# Patient Record
Sex: MALE | Race: WHITE | Employment: FULL TIME | URBAN - METROPOLITAN AREA
[De-identification: names, ages, dates, MRNs, and addresses within clinical notes are randomized per-mention and may not be internally consistent; named-entity substitution may affect disease eponyms.]

---

## 2019-03-15 ENCOUNTER — HOSPITAL ENCOUNTER (OUTPATIENT)
Age: 49
Setting detail: SPECIMEN
Discharge: HOME | End: 2019-03-15
Payer: COMMERCIAL

## 2019-03-15 PROCEDURE — 80048 BASIC METABOLIC PNL TOTAL CA: CPT

## 2019-03-15 PROCEDURE — 85025 COMPLETE CBC W/AUTO DIFF WBC: CPT

## 2019-03-16 LAB
ABSOLUTE EOS #: 0.42 K/UL (ref 0–0.44)
ABSOLUTE IMMATURE GRANULOCYTE: 0.04 K/UL (ref 0–0.3)
ABSOLUTE LYMPH #: 2.75 K/UL (ref 1.1–3.7)
ABSOLUTE MONO #: 1.03 K/UL (ref 0.1–1.2)
ANION GAP SERPL CALCULATED.3IONS-SCNC: 14 MMOL/L (ref 9–17)
BASOPHILS # BLD: 1 % (ref 0–2)
BASOPHILS ABSOLUTE: 0.11 K/UL (ref 0–0.2)
BUN BLDV-MCNC: 17 MG/DL (ref 6–20)
BUN/CREAT BLD: ABNORMAL (ref 9–20)
CALCIUM SERPL-MCNC: 9 MG/DL (ref 8.6–10.4)
CHLORIDE BLD-SCNC: 101 MMOL/L (ref 98–107)
CO2: 25 MMOL/L (ref 20–31)
CREAT SERPL-MCNC: 0.97 MG/DL (ref 0.7–1.2)
DIFFERENTIAL TYPE: ABNORMAL
EOSINOPHILS RELATIVE PERCENT: 3 % (ref 1–4)
GFR AFRICAN AMERICAN: >60 ML/MIN
GFR NON-AFRICAN AMERICAN: >60 ML/MIN
GFR SERPL CREATININE-BSD FRML MDRD: ABNORMAL ML/MIN/{1.73_M2}
GFR SERPL CREATININE-BSD FRML MDRD: ABNORMAL ML/MIN/{1.73_M2}
GLUCOSE BLD-MCNC: 264 MG/DL (ref 70–99)
HCT VFR BLD CALC: 41.8 % (ref 40.7–50.3)
HEMOGLOBIN: 13.6 G/DL (ref 13–17)
IMMATURE GRANULOCYTES: 0 %
LYMPHOCYTES # BLD: 20 % (ref 24–43)
MCH RBC QN AUTO: 29.4 PG (ref 25.2–33.5)
MCHC RBC AUTO-ENTMCNC: 32.5 G/DL (ref 28.4–34.8)
MCV RBC AUTO: 90.5 FL (ref 82.6–102.9)
MONOCYTES # BLD: 7 % (ref 3–12)
NRBC AUTOMATED: 0 PER 100 WBC
PDW BLD-RTO: 13 % (ref 11.8–14.4)
PLATELET # BLD: 224 K/UL (ref 138–453)
PLATELET ESTIMATE: ABNORMAL
PMV BLD AUTO: 12.3 FL (ref 8.1–13.5)
POTASSIUM SERPL-SCNC: 4 MMOL/L (ref 3.7–5.3)
RBC # BLD: 4.62 M/UL (ref 4.21–5.77)
RBC # BLD: ABNORMAL 10*6/UL
SEG NEUTROPHILS: 69 % (ref 36–65)
SEGMENTED NEUTROPHILS ABSOLUTE COUNT: 9.73 K/UL (ref 1.5–8.1)
SODIUM BLD-SCNC: 140 MMOL/L (ref 135–144)
WBC # BLD: 14.1 K/UL (ref 3.5–11.3)
WBC # BLD: ABNORMAL 10*3/UL

## 2019-03-25 PROBLEM — Z22.322 MRSA (METHICILLIN RESISTANT STAPH AUREUS) CULTURE POSITIVE: Status: ACTIVE | Noted: 2019-03-25

## 2019-03-25 PROBLEM — E11.621 DIABETIC ULCER OF TOE OF RIGHT FOOT (HCC): Status: ACTIVE | Noted: 2019-03-25

## 2019-03-25 PROBLEM — M86.071 ACUTE HEMATOGENOUS OSTEOMYELITIS OF RIGHT FOOT (HCC): Status: ACTIVE | Noted: 2019-03-25

## 2019-03-25 PROBLEM — L97.519 DIABETIC ULCER OF TOE OF RIGHT FOOT (HCC): Status: ACTIVE | Noted: 2019-03-25

## 2019-03-25 PROBLEM — Z79.4 CONTROLLED TYPE 2 DIABETES MELLITUS WITH DIABETIC POLYNEUROPATHY, WITH LONG-TERM CURRENT USE OF INSULIN (HCC): Status: ACTIVE | Noted: 2019-03-25

## 2019-03-25 PROBLEM — E11.42 CONTROLLED TYPE 2 DIABETES MELLITUS WITH DIABETIC POLYNEUROPATHY, WITH LONG-TERM CURRENT USE OF INSULIN (HCC): Status: ACTIVE | Noted: 2019-03-25

## 2019-03-25 PROBLEM — L08.89: Status: ACTIVE | Noted: 2019-03-25

## 2019-03-25 PROBLEM — A49.1 GROUP C STREPTOCOCCAL INFECTION: Status: ACTIVE | Noted: 2019-03-25

## 2019-03-26 ENCOUNTER — OFFICE VISIT (OUTPATIENT)
Dept: INFECTIOUS DISEASES | Age: 49
End: 2019-03-26
Payer: COMMERCIAL

## 2019-03-26 VITALS
WEIGHT: 244 LBS | HEART RATE: 87 BPM | HEIGHT: 73 IN | RESPIRATION RATE: 16 BRPM | BODY MASS INDEX: 32.34 KG/M2 | TEMPERATURE: 98.2 F | OXYGEN SATURATION: 99 % | SYSTOLIC BLOOD PRESSURE: 134 MMHG | DIASTOLIC BLOOD PRESSURE: 88 MMHG

## 2019-03-26 DIAGNOSIS — E11.42 CONTROLLED TYPE 2 DIABETES MELLITUS WITH DIABETIC POLYNEUROPATHY, WITH LONG-TERM CURRENT USE OF INSULIN (HCC): ICD-10-CM

## 2019-03-26 DIAGNOSIS — E11.621 DIABETIC ULCER OF TOE OF RIGHT FOOT ASSOCIATED WITH TYPE 2 DIABETES MELLITUS, WITH BONE INVOLVEMENT WITHOUT EVIDENCE OF NECROSIS (HCC): ICD-10-CM

## 2019-03-26 DIAGNOSIS — L08.89 INFECTION DUE TO CORYNEBACTERIUM MINUTISSIMUM: ICD-10-CM

## 2019-03-26 DIAGNOSIS — Z22.322 MRSA (METHICILLIN RESISTANT STAPH AUREUS) CULTURE POSITIVE: ICD-10-CM

## 2019-03-26 DIAGNOSIS — A49.1 GROUP C STREPTOCOCCAL INFECTION: ICD-10-CM

## 2019-03-26 DIAGNOSIS — L97.516 DIABETIC ULCER OF TOE OF RIGHT FOOT ASSOCIATED WITH TYPE 2 DIABETES MELLITUS, WITH BONE INVOLVEMENT WITHOUT EVIDENCE OF NECROSIS (HCC): ICD-10-CM

## 2019-03-26 DIAGNOSIS — M86.9 OSTEOMYELITIS OF TOE OF RIGHT FOOT (HCC): Primary | ICD-10-CM

## 2019-03-26 DIAGNOSIS — Z79.4 CONTROLLED TYPE 2 DIABETES MELLITUS WITH DIABETIC POLYNEUROPATHY, WITH LONG-TERM CURRENT USE OF INSULIN (HCC): ICD-10-CM

## 2019-03-26 PROCEDURE — 99205 OFFICE O/P NEW HI 60 MIN: CPT | Performed by: INTERNAL MEDICINE

## 2019-03-26 RX ORDER — METOPROLOL TARTRATE 50 MG/1
50 TABLET, FILM COATED ORAL 2 TIMES DAILY
COMMUNITY

## 2019-03-26 RX ORDER — LISINOPRIL 40 MG/1
40 TABLET ORAL DAILY
COMMUNITY

## 2019-03-26 RX ORDER — GABAPENTIN 600 MG/1
600 TABLET ORAL 3 TIMES DAILY
COMMUNITY
End: 2021-02-01

## 2019-03-26 RX ORDER — DOXYCYCLINE HYCLATE 100 MG
100 TABLET ORAL 2 TIMES DAILY
Qty: 60 TABLET | Refills: 1 | Status: SHIPPED | OUTPATIENT
Start: 2019-04-08 | End: 2019-05-08

## 2019-03-27 ENCOUNTER — TELEPHONE (OUTPATIENT)
Dept: INFECTIOUS DISEASES | Age: 49
End: 2019-03-27

## 2019-04-29 NOTE — PROGRESS NOTES
[facility] - Office Progress Note  Today's Date and Time: 4/30/2019, 4:37 PM    Diagnostic Impression :     1. Acute hematogenous osteomyelitis of right foot ([address]    2. Diabetic ulcer of toe of right foot associated with diabetes mellitus due to underlying condition, with necrosis of muscle ([address]    3. MRSA (methicillin resistant staph aureus) culture positive    4. 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 Group C streptococcal infection    6. Controlled type 2 diabetes mellitus with diabetic polyneuropathy, with long-term current use of insulin ([facility])        Recommendations   · Doxycycline 100 mg po BID through 5/8/19  · Follow up with Dr Dmitri Sepulveda as scheduled  · Follow up as needed, soul additional problems arise    Chief complaint/reason for consultation:     Chief Complaint   Patient presents with    Follow-up     Osteomyelitis Rt Toe       History of Present Illness: Robyn Reeves is a 53y.o.-year-old  male who was evaluated on 4/30/2019. Patient seen at the request of Dr. Jose West:     Pt is a 52 yo gentleman with past medical history of type 2 diabetes with diabetic neuropathy and  diabetic foot ulcer, hypertension. He is been being treated for a chronic Rt  Diabetic toe wound/ulcer. He was at his Podiatrists office on 3-11-19 when he was noted to have redness extending to his ankle as well as purulent drainage from the toe wound.  A culture of the drainage was obtained and he was sent to the ER.     Marcell presented to [facility] ER and on Dr Iker Kearns recommendation he was started on Vancomycin and Cipro (because of a noted penicillin allergy), the antibiotics were adjusted to Vancomycin and Zosyn after the pt clarified that he did not have a penicillin allergy.     At the time of admission he reported that he had been having chills, fevers and nausea for the prior 3 days.      His foot improved immediately after starting the antibiotics. An MRI showed potential early acute osteomyelitis of the Rt distal first phalanx, low T1 signal, height signal and oseous enhancement indicating mild osteomyelitis in the distal tip of the phalanx associated with cellulitis in the distal medial and plantar aspect of the first phalanx, a small rim enhancing fluid pocket near the wound is 7mm and could be a small abscess in the soft tissues. Bone marrow edema in the proximal first phalanx and distal mid first metatarsal.     The culture showed MRSA, Corynebacterium and Group C Streptococci. Blood cultures were negative. A PICC line was placed and he was discharged home with a 6 week course of Vancomycin. Wound care was again consulted and aquacel alginate dressing changes were recommended. Office visit 3-26-19  Pt was tolerating the Vancomycin without difficulty  He had been seeing Dr Maximilian Singh weekly. The wound was dressed and to be changed only at wound care. He reported that he felt well without fever, chills, or redness. CURRENT EXAMINATION: 4/30/2019     Pt reports feeling well. He has been discharged from wound care  Follows with Dr Maximilian Singh every 6 weeks    His Rt toe wound has healed  No chills, fevers, malaise, night sweats or pain  No drainage or redness of the wound site  No pain, warmth or fluctuance in the soft tissues. Blood sugars are running 140-180          DISCUSSION:  · Patient with DM 2  · Diabetic neuropathy  · Plantar ulceration of hallux with subsequent acute infection with cellulitis of foot  · Cultures with MRSA, Grp C Strep  · MRI with bone edema in proximal phalanx Rt hallux  · Improved with IV vancomycin and wound care. · Completed Vancomycin on 4/10 and transitioned to Doxycycline x 30 days  · Wound has healed well.    · No additional treatment required after current prescription is completed.     PLAN:  · Doxycycline 100 mg po BID through 5/8/19  · Follow up with Dr Maximilian Singh as scheduled  · Follow up as needed, soul additional problems arise    Discussed with patient. I have personally reviewed the past medical history, past surgical history, medications, social history, and family history, and I have updated the database accordingly. Past Medical History:   History reviewed. No pertinent past medical history. Past Surgical  History:   History reviewed. No pertinent surgical history. Medications:     Current Outpatient Medications:     metFORMIN (GLUCOPHAGE) 500 MG tablet, Take 500 mg by mouth 2 times daily (with meals), Disp: , Rfl:     gabapentin (NEURONTIN) 600 MG tablet, Take 600 mg by mouth 3 times daily. , Disp: , Rfl:     lisinopril (PRINIVIL;ZESTRIL) 40 MG tablet, Take 40 mg by mouth daily, Disp: , Rfl:     Liraglutide (VICTOZA SC), Inject into the skin, Disp: , Rfl:     Insulin Aspart (NOVOLOG SC), Inject into the skin, Disp: , Rfl:     metoprolol tartrate (LOPRESSOR) 50 MG tablet, Take 50 mg by mouth 2 times daily, Disp: , Rfl:     doxycycline hyclate (VIBRA-TABS) 100 MG tablet, Take 1 tablet by mouth 2 times daily, Disp: 60 tablet, Rfl: 1     Social History:     Social History     Socioeconomic History    Marital status: Single     Spouse name: Not on file    Number of children: Not on file    Years of education: Not on file    Highest education level: Not on file   Occupational History    Not on file   Social Needs    Financial resource strain: Not on file    Food insecurity:     Worry: Not on file     Inability: Not on file    Transportation needs:     Medical: Not on file     Non-medical: Not on file   Tobacco Use    Smoking status: Current Every Day Smoker     Types: Cigarettes    Smokeless tobacco: Never Used   Substance and Sexual Activity    Alcohol use: Not on file    Drug use: Not on file    Sexual activity: Not on file   Lifestyle    Physical activity:     Days per week: Not on file     Minutes per session: Not on file    Stress: Not on file   Relationships    Social connections:     Talks on phone: Not on file     Gets together: Not on file     Attends Buddhism service: Not on file     Active member of club or organization: Not on file     Attends meetings of clubs or organizations: Not on file     Relationship status: Not on file    Intimate partner violence:     Fear of current or ex partner: Not on file     Emotionally abused: Not on file     Physically abused: Not on file     Forced sexual activity: Not on file   Other Topics Concern    Not on file   Social History Narrative    Not on file       Family History:   History reviewed. No pertinent family history. Allergies:   Patient has no known allergies. Review of Systems:   Constitutional: No fevers or chills. No systemic complaints  Head: No headaches  Eyes: No double vision or blurry vision. ENT: No sore throat or runny nose. . No hearing loss, tinnitus or vertigo. Cardiovascular: No chest pain or palpitations. No shortness of breath. No HSU  Lung: No shortness of breath or cough. No sputum production  Abdomen: No nausea, vomiting, diarrhea, or abdominal pain. .  Genitourinary: No increased urinary frequency, or dysuria. No hematuria. No suprapubic or CVA pain  Musculoskeletal: No muscle aches or pains. No joint effusions, swelling or deformities  Hematologic: No bleeding or bruising. Neurologic: No headache, weakness. Peripheral neuropathy. Physical Examination :   /60   Pulse 83   Temp 97.9 °F (36.6 °C)   Resp 16   Ht 6' 1\" (1.854 m)   Wt 248 lb (112.5 kg)   SpO2 99% Comment: room air at rest  BMI 32.72 kg/m²    General Appearance: Awake, alert, and in no apparent distress  Head:  Normocephalic, no trauma  Eyes: Pupils equal, round, reactive, to light and accommodation; extraocular movements intact; sclera anicteric; conjunctivae pink. No embolic phenomena. ENT: Oropharynx clear, without erythema, exudate, or thrush. No tenderness of sinuses.  Mouth/throat: mucosa pink and moist. No

## 2019-04-30 ENCOUNTER — OFFICE VISIT (OUTPATIENT)
Dept: INFECTIOUS DISEASES | Age: 49
End: 2019-04-30
Payer: COMMERCIAL

## 2019-04-30 VITALS
TEMPERATURE: 97.9 F | BODY MASS INDEX: 32.87 KG/M2 | HEART RATE: 83 BPM | DIASTOLIC BLOOD PRESSURE: 60 MMHG | OXYGEN SATURATION: 99 % | RESPIRATION RATE: 16 BRPM | HEIGHT: 73 IN | SYSTOLIC BLOOD PRESSURE: 110 MMHG | WEIGHT: 248 LBS

## 2019-04-30 DIAGNOSIS — Z22.322 MRSA (METHICILLIN RESISTANT STAPH AUREUS) CULTURE POSITIVE: ICD-10-CM

## 2019-04-30 DIAGNOSIS — Z79.4 CONTROLLED TYPE 2 DIABETES MELLITUS WITH DIABETIC POLYNEUROPATHY, WITH LONG-TERM CURRENT USE OF INSULIN (HCC): ICD-10-CM

## 2019-04-30 DIAGNOSIS — L08.89 INFECTION DUE TO CORYNEBACTERIUM MINUTISSIMUM: ICD-10-CM

## 2019-04-30 DIAGNOSIS — M86.071 ACUTE HEMATOGENOUS OSTEOMYELITIS OF RIGHT FOOT (HCC): Primary | ICD-10-CM

## 2019-04-30 DIAGNOSIS — E08.621 DIABETIC ULCER OF TOE OF RIGHT FOOT ASSOCIATED WITH DIABETES MELLITUS DUE TO UNDERLYING CONDITION, WITH NECROSIS OF MUSCLE (HCC): ICD-10-CM

## 2019-04-30 DIAGNOSIS — E11.42 CONTROLLED TYPE 2 DIABETES MELLITUS WITH DIABETIC POLYNEUROPATHY, WITH LONG-TERM CURRENT USE OF INSULIN (HCC): ICD-10-CM

## 2019-04-30 DIAGNOSIS — A49.1 GROUP C STREPTOCOCCAL INFECTION: ICD-10-CM

## 2019-04-30 DIAGNOSIS — L97.513 DIABETIC ULCER OF TOE OF RIGHT FOOT ASSOCIATED WITH DIABETES MELLITUS DUE TO UNDERLYING CONDITION, WITH NECROSIS OF MUSCLE (HCC): ICD-10-CM

## 2019-04-30 PROCEDURE — 99214 OFFICE O/P EST MOD 30 MIN: CPT | Performed by: INTERNAL MEDICINE

## 2019-04-30 NOTE — LETTER
[facility] [address] [address]  Phone: [phone]  Fax: [phone]    PCP: Orly Jones DPM   CC providers:  [facility] [address] [facility]       April 30, 2019       Patient: Marylee Boros   MR Number: [id number]   YOB: 1970   Date of Visit: 4/30/2019     Dear Ajay Cline: Thank you for allowing me to see your patient Mr. Marylee Boros. Below are the relevant portions of my assessment and plan of care. [facility] - Office Progress Note  Today's Date and Time: 4/30/2019, 4:37 PM    Diagnostic Impression :     1. Acute hematogenous osteomyelitis of right foot ([address]    2. Diabetic ulcer of toe of right foot associated with diabetes mellitus due to underlying condition, with necrosis of muscle ([address]    3. MRSA (methicillin resistant staph aureus) culture positive    4. 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 Group C streptococcal infection    6. Controlled type 2 diabetes mellitus with diabetic polyneuropathy, with long-term current use of insulin ([facility])        Recommendations ·   Doxycycline 100 mg po BID through 5/8/19  · Follow up with Dr Amanda Mcgrath as scheduled  · Follow up as needed, soul additional problems arise    Chief complaint/reason for consultation:     Chief Complaint   Patient presents with    Follow-up     Osteomyelitis Rt Toe       History of Present Illness: Marylee Boros is a 53y.o.-year-old  male who was evaluated on 4/30/2019. Patient seen at the request of Dr. Garry Silver:     Pt is a 52 yo gentleman with past medical history of type 2 diabetes with diabetic neuropathy and  diabetic foot ulcer, hypertension. He is been being treated for a chronic Rt  Diabetic toe wound/ulcer.    He was at his Podiatrists office on 3-11-19 when he was noted to have redness extending to his ankle as well as purulent drainage from the toe wound. A culture of the drainage was obtained and he was sent to the ER.     Pt presented to [facility] ER and on Dr Yadira Mcqueen recommendation he was started on Vancomycin and Cipro (because of a noted penicillin allergy), the antibiotics were adjusted to Vancomycin and Zosyn after the pt clarified that he did not have a penicillin allergy.     At the time of admission he reported that he had been having chills, fevers and nausea for the prior 3 days.      His foot improved immediately after starting the antibiotics. An MRI showed potential early acute osteomyelitis of the Rt distal first phalanx, low T1 signal, height signal and oseous enhancement indicating mild osteomyelitis in the distal tip of the phalanx associated with cellulitis in the distal medial and plantar aspect of the first phalanx, a small rim enhancing fluid pocket near the wound is 7mm and could be a small abscess in the soft tissues. Bone marrow edema in the proximal first phalanx and distal mid first metatarsal.     The culture showed MRSA, Corynebacterium and Group C Streptococci. Blood cultures were negative. A PICC line was placed and he was discharged home with a 6 week course of Vancomycin. Wound care was again consulted and aquacel alginate dressing changes were recommended. Office visit 3-26-19  Pt was tolerating the Vancomycin without difficulty  He had been seeing Dr Celine Palomo weekly. The wound was dressed and to be changed only at wound care. He reported that he felt well without fever, chills, or redness. CURRENT EXAMINATION: 4/30/2019     Pt reports feeling well. He has been discharged from wound care  Follows with Dr Celine Palomo every 6 weeks    His Rt toe wound has healed  No chills, fevers, malaise, night sweats or pain  No drainage or redness of the wound site  No pain, warmth or fluctuance in the soft tissues.   Blood sugars are running 140-180 DISCUSSION:  · Patient with DM 2  · Diabetic neuropathy  · Plantar ulceration of hallux with subsequent acute infection with cellulitis of foot  · Cultures with MRSA, Grp C Strep  · MRI with bone edema in proximal phalanx Rt hallux  · Improved with IV vancomycin and wound care. · Completed Vancomycin on 4/10 and transitioned to Doxycycline x 30 days  · Wound has healed well. · No additional treatment required after current prescription is completed.     PLAN:  · Doxycycline 100 mg po BID through 5/8/19  · Follow up with Dr Lesley Abbott as scheduled  · Follow up as needed, soul additional problems arise    Discussed with patient. I have personally reviewed the past medical history, past surgical history, medications, social history, and family history, and I have updated the database accordingly. Past Medical History:   History reviewed. No pertinent past medical history. Past Surgical  History:   History reviewed. No pertinent surgical history. Medications:     Current Outpatient Medications:     metFORMIN (GLUCOPHAGE) 500 MG tablet, Take 500 mg by mouth 2 times daily (with meals), Disp: , Rfl:     gabapentin (NEURONTIN) 600 MG tablet, Take 600 mg by mouth 3 times daily. , Disp: , Rfl:     lisinopril (PRINIVIL;ZESTRIL) 40 MG tablet, Take 40 mg by mouth daily, Disp: , Rfl:     Liraglutide (VICTOZA SC), Inject into the skin, Disp: , Rfl:     Insulin Aspart (NOVOLOG SC), Inject into the skin, Disp: , Rfl:     metoprolol tartrate (LOPRESSOR) 50 MG tablet, Take 50 mg by mouth 2 times daily, Disp: , Rfl:     doxycycline hyclate (VIBRA-TABS) 100 MG tablet, Take 1 tablet by mouth 2 times daily, Disp: 60 tablet, Rfl: 1     Social History:     Social History     Socioeconomic History    Marital status: Single     Spouse name: Not on file    Number of children: Not on file    Years of education: Not on file    Highest education level: Not on file   Occupational History    Not on file   Social Needs  Financial resource strain: Not on file   Claudia-David insecurity:     Worry: Not on file     Inability: Not on file    Transportation needs:     Medical: Not on file     Non-medical: Not on file   Tobacco Use    Smoking status: Current Every Day Smoker     Types: Cigarettes    Smokeless tobacco: Never Used   Substance and Sexual Activity    Alcohol use: Not on file    Drug use: Not on file    Sexual activity: Not on file   Lifestyle    Physical activity:     Days per week: Not on file     Minutes per session: Not on file    Stress: Not on file   Relationships    Social connections:     Talks on phone: Not on file     Gets together: Not on file     Attends Yazidi service: Not on file     Active member of club or organization: Not on file     Attends meetings of clubs or organizations: Not on file     Relationship status: Not on file    Intimate partner violence:     Fear of current or ex partner: Not on file     Emotionally abused: Not on file     Physically abused: Not on file     Forced sexual activity: Not on file   Other Topics Concern    Not on file   Social History Narrative    Not on file       Family History:   History reviewed. No pertinent family history. Allergies:   Patient has no known allergies. Review of Systems:   Constitutional: No fevers or chills. No systemic complaints  Head: No headaches  Eyes: No double vision or blurry vision. ENT: No sore throat or runny nose. . No hearing loss, tinnitus or vertigo. Cardiovascular: No chest pain or palpitations. No shortness of breath. No HSU  Lung: No shortness of breath or cough. No sputum production  Abdomen: No nausea, vomiting, diarrhea, or abdominal pain. .  Genitourinary: No increased urinary frequency, or dysuria. No hematuria. No suprapubic or CVA pain  Musculoskeletal: No muscle aches or pains. No joint effusions, swelling or deformities  Hematologic: No bleeding or bruising. BUN 17 03/15/2019    CREATININE 0.97 03/15/2019     Hepatic Function Panel: No results found for: PROT, LABALBU, BILIDIR, IBILI, BILITOT, ALKPHOS, ALT, AST  No results found for: RPR  No results found for: HIV  No results found for: [facility]  Lab Results   Component Value Date    RBC 4.62 03/15/2019    WBC 14.1 03/15/2019     Lab Results   Component Value Date    CREATININE 0.97 03/15/2019    GLUCOSE 264 03/15/2019       Thank you for allowing us to participate in the care of this patient. Please call with questions. Pilar Estrada MD  Pager: [phone] - Office: [phone]        If you have questions, please do not hesitate to call me. I look forward to following Braden along with you.     Sincerely,        Pilar Estrada MD

## 2021-02-01 ENCOUNTER — HOSPITAL ENCOUNTER (OUTPATIENT)
Dept: PREADMISSION TESTING | Age: 51
Discharge: HOME | End: 2021-02-05
Payer: COMMERCIAL

## 2021-02-01 VITALS
HEIGHT: 73 IN | TEMPERATURE: 96.7 F | DIASTOLIC BLOOD PRESSURE: 69 MMHG | BODY MASS INDEX: 33.8 KG/M2 | SYSTOLIC BLOOD PRESSURE: 140 MMHG | HEART RATE: 78 BPM | OXYGEN SATURATION: 100 % | RESPIRATION RATE: 20 BRPM | WEIGHT: 255 LBS

## 2021-02-01 LAB
ABSOLUTE EOS #: 0.53 K/UL (ref 0–0.44)
ABSOLUTE IMMATURE GRANULOCYTE: 0.08 K/UL (ref 0–0.3)
ABSOLUTE LYMPH #: 3.84 K/UL (ref 1.1–3.7)
ABSOLUTE MONO #: 1.46 K/UL (ref 0.1–1.2)
ANION GAP SERPL CALCULATED.3IONS-SCNC: 9 MMOL/L (ref 9–17)
BASOPHILS # BLD: 1 % (ref 0–2)
BASOPHILS ABSOLUTE: 0.17 K/UL (ref 0–0.2)
BILIRUBIN URINE: NEGATIVE
BUN BLDV-MCNC: 13 MG/DL (ref 6–20)
BUN/CREAT BLD: 13 (ref 9–20)
CALCIUM SERPL-MCNC: 9.9 MG/DL (ref 8.6–10.4)
CHLORIDE BLD-SCNC: 107 MMOL/L (ref 98–107)
CO2: 27 MMOL/L (ref 20–31)
COLOR: YELLOW
COMMENT UA: ABNORMAL
CREAT SERPL-MCNC: 0.99 MG/DL (ref 0.7–1.2)
DIFFERENTIAL TYPE: ABNORMAL
EOSINOPHILS RELATIVE PERCENT: 3 % (ref 1–4)
GFR AFRICAN AMERICAN: >60 ML/MIN
GFR NON-AFRICAN AMERICAN: >60 ML/MIN
GFR SERPL CREATININE-BSD FRML MDRD: ABNORMAL ML/MIN/{1.73_M2}
GFR SERPL CREATININE-BSD FRML MDRD: ABNORMAL ML/MIN/{1.73_M2}
GLUCOSE BLD-MCNC: 57 MG/DL (ref 70–99)
GLUCOSE URINE: ABNORMAL
HCT VFR BLD CALC: 50.5 % (ref 40.7–50.3)
HEMOGLOBIN: 15.7 G/DL (ref 13–17)
IMMATURE GRANULOCYTES: 1 %
INR BLD: 1
KETONES, URINE: NEGATIVE
LEUKOCYTE ESTERASE, URINE: NEGATIVE
LYMPHOCYTES # BLD: 24 % (ref 24–43)
MCH RBC QN AUTO: 28.6 PG (ref 25.2–33.5)
MCHC RBC AUTO-ENTMCNC: 31.1 G/DL (ref 28.4–34.8)
MCV RBC AUTO: 92 FL (ref 82.6–102.9)
MONOCYTES # BLD: 9 % (ref 3–12)
NITRITE, URINE: NEGATIVE
NRBC AUTOMATED: 0 PER 100 WBC
PARTIAL THROMBOPLASTIN TIME: 31 SEC (ref 23.9–33.8)
PDW BLD-RTO: 13.5 % (ref 11.8–14.4)
PH UA: 6 (ref 5–8)
PLATELET # BLD: 304 K/UL (ref 138–453)
PLATELET ESTIMATE: ABNORMAL
PMV BLD AUTO: 11 FL (ref 8.1–13.5)
POTASSIUM SERPL-SCNC: 4.2 MMOL/L (ref 3.7–5.3)
PROTEIN UA: NEGATIVE
PROTHROMBIN TIME: 13 SEC (ref 11.5–14.2)
RBC # BLD: 5.49 M/UL (ref 4.21–5.77)
RBC # BLD: ABNORMAL 10*6/UL
SEG NEUTROPHILS: 62 % (ref 36–65)
SEGMENTED NEUTROPHILS ABSOLUTE COUNT: 10.05 K/UL (ref 1.5–8.1)
SODIUM BLD-SCNC: 143 MMOL/L (ref 135–144)
SPECIFIC GRAVITY UA: 1.02 (ref 1–1.03)
TURBIDITY: CLEAR
URINE HGB: NEGATIVE
UROBILINOGEN, URINE: NORMAL
WBC # BLD: 16.1 K/UL (ref 3.5–11.3)
WBC # BLD: ABNORMAL 10*3/UL

## 2021-02-01 PROCEDURE — 85610 PROTHROMBIN TIME: CPT

## 2021-02-01 PROCEDURE — 87641 MR-STAPH DNA AMP PROBE: CPT

## 2021-02-01 PROCEDURE — 80048 BASIC METABOLIC PNL TOTAL CA: CPT

## 2021-02-01 PROCEDURE — 85730 THROMBOPLASTIN TIME PARTIAL: CPT

## 2021-02-01 PROCEDURE — 93005 ELECTROCARDIOGRAM TRACING: CPT | Performed by: ORTHOPAEDIC SURGERY

## 2021-02-01 PROCEDURE — 81003 URINALYSIS AUTO W/O SCOPE: CPT

## 2021-02-01 PROCEDURE — 85025 COMPLETE CBC W/AUTO DIFF WBC: CPT

## 2021-02-01 PROCEDURE — 36415 COLL VENOUS BLD VENIPUNCTURE: CPT

## 2021-02-01 RX ORDER — ASPIRIN 81 MG/1
81 TABLET, CHEWABLE ORAL DAILY
Status: ON HOLD | COMMUNITY
End: 2021-02-23 | Stop reason: HOSPADM

## 2021-02-01 RX ORDER — PREGABALIN 50 MG/1
50 CAPSULE ORAL 2 TIMES DAILY
COMMUNITY

## 2021-02-01 RX ORDER — OMEPRAZOLE 20 MG/1
20 CAPSULE, DELAYED RELEASE ORAL DAILY
COMMUNITY

## 2021-02-01 ASSESSMENT — PAIN SCALES - GENERAL: PAINLEVEL_OUTOF10: 8

## 2021-02-01 ASSESSMENT — PAIN DESCRIPTION - DESCRIPTORS: DESCRIPTORS: ACHING

## 2021-02-01 ASSESSMENT — PAIN DESCRIPTION - PAIN TYPE: TYPE: CHRONIC PAIN

## 2021-02-01 NOTE — PRE-PROCEDURE INSTRUCTIONS
[address] ON Feb 22,2021 at 5:30 am  Please call [phone]  COVID test 2/18/21 7:20 am     Once you enter the hospital lobby, take the elevators to the second floor. Check-In is at the surgery registration desk. Continue to take your home medications as you normally do up to and including the night before surgery with the exception of any blood thinning medications. Please stop any blood thinning medications as directed by your surgeon or prescribing physician. Failure to stop certain medications may interfere with your scheduled surgery. These may include:  Aspirin, Warfarin (Coumadin), Clopidogrel (Plavix), Ibuprofen (Motrin, Advil), Naproxen (Aleve), Meloxicam (Mobic), Celecoxib (Celebrex), Eliquis, Pradaxa, Xarelto, Effient, Fish Oil, Herbal supplements. If you are diabetic, do not take any of your diabetic medications by mouth the morning of surgery. If you are taking insulin contact the doctor that manages your diabetes for instructions about any changes to your insulin dosages the day before surgery. Do not inject insulin or other injectable diabetic medications the morning of surgery unless otherwise instructed by the doctor who manages your diabetes. Please take the following medication(s) the day of surgery with a small sip of water:  Metoprolol,Prilosec      PREPARING FOR YOUR SURGERY:     Before surgery, you can play an important role in your own health. Because skin is not sterile, we need to be sure that your skin is as free of germs as possible before surgery by carefully washing before surgery. Preparing or prepping skin before surgery can reduce the risk of a surgical site infection.   Do not shave the area of your body where your surgery will be performed unless you received specific permission from your physician.     You will need to shower at home the night before surgery and the morning of surgery with a special soap called chlorhexidine gluconate (CHG*). *Not to be used by people allergic to Chlorhexidine Gluconate (CHG). Following these instructions will help you be sure that your skin is clean before surgery. Instructions on cleaning your skin before surgery: The night before your surgery:      You will need to shower with warm water (not hot) and the CHG soap.  Use a clean wash cloth and a clean towel. Have clean clothes available to put on after the shower.   First wash your hair with regular shampoo. Rinse your hair and body thoroughly to remove the shampoo. Onofre Wash your face with your regular soap or water only. Thoroughly rinse your body with warm water from the neck down.  Turn water off to prevent rinsing the soap off too soon.  With a clean wet washcloth and half of the CHG soap in the bottle, lather your entire body from the neck down. Do not use CHG soap near your eyes or ears to avoid injury to those areas.  Wash thoroughly, paying special attention to the area where your surgery will be performed.  Wash your body gently for five (5) minutes. Avoid scrubbing your skin too hard.  Turn the water back on and rinse your body thoroughly.  Pat yourself dry with a clean, soft towel. Do not apply lotion, cream or powder.  Dress with clean freshly washed clothes. The morning of surgery:     Repeat shower following steps above - using remaining half of CHG soap in bottle. Patient Instructions:    Onofre If you are having any type of anesthesia you are to have nothing to eat or drink after midnight the night before your surgery. This includes gum, mints, water or smoking or chewing tobacco.  The only exception to this is a small sip of water to take with any morning dose of heart, blood pressure, or seizure medications. No alcoholic beverages for 24 hours prior to surgery.  Brush your teeth but do not swallow water. · Do not wear any jewelry or body piercings day of surgery.   Also, NO lotion, perfume or deodorant to be used the day of surgery. No nail polish on the operative extremity (arm/leg surgeries)    · Do not bring any valuables such as jewelry, cash, or credit cards. If you are staying overnight with us, please bring a small bag of personal items.  Please wear loose, comfortable clothing. If you are potentially going to have a cast or brace bring clothing that will fit over them.  In case of illness - If you have cold or flu like symptoms (high fever, runny nose, sore throat, cough, etc.) rash, nausea, vomiting, loose stools, and/or recent contact with someone who has a contagious disease (chicken pox, measles, etc.) Please call your doctor before coming to the hospital.       Day of Surgery/Procedure:    As a patient at [facility] you can expect quality medical and nursing care that is centered on your individual needs. Our goal is to make your surgical experience as comfortable as possible    . Transportation After Your Surgery/Procedure: You will need a friend or family member to drive you home after your procedure. Your  must be 25years of age or older and able to sign off on your discharge instructions. A taxi cab or any other form of public transportation is not acceptable. Your friend or family member must stay at the hospital throughout your procedure. Someone must remain with you for the first 24 hours after your surgery if you receive anesthesia or medication. If you do not have someone to stay with you, your procedure may be cancelled.       If you have any other questions regarding your procedure or the day of surgery, please call [phone]      _________________________  ____________________________  Signature (Patient)              Signature (Provider) & date

## 2021-02-02 LAB
DIRECT EXAM: NORMAL
EKG ATRIAL RATE: 74 BPM
EKG P AXIS: 48 DEGREES
EKG P-R INTERVAL: 178 MS
EKG Q-T INTERVAL: 406 MS
EKG QRS DURATION: 98 MS
EKG QTC CALCULATION (BAZETT): 450 MS
EKG R AXIS: -14 DEGREES
EKG T AXIS: 15 DEGREES
EKG VENTRICULAR RATE: 74 BPM
Lab: NORMAL
SPECIMEN DESCRIPTION: NORMAL

## 2021-02-02 PROCEDURE — 93010 ELECTROCARDIOGRAM REPORT: CPT | Performed by: INTERNAL MEDICINE

## 2021-02-18 ENCOUNTER — HOSPITAL ENCOUNTER (OUTPATIENT)
Dept: LAB | Age: 51
Setting detail: SPECIMEN
Discharge: HOME | End: 2021-02-18
Payer: COMMERCIAL

## 2021-02-18 DIAGNOSIS — Z01.818 PREOP TESTING: Primary | ICD-10-CM

## 2021-02-18 LAB
SARS-COV-2: NORMAL
SARS-COV-2: NOT DETECTED
SOURCE: NORMAL

## 2021-02-18 PROCEDURE — U0003 INFECTIOUS AGENT DETECTION BY NUCLEIC ACID (DNA OR RNA); SEVERE ACUTE RESPIRATORY SYNDROME CORONAVIRUS 2 (SARS-COV-2) (CORONAVIRUS DISEASE [COVID-19]), AMPLIFIED PROBE TECHNIQUE, MAKING USE OF HIGH THROUGHPUT TECHNOLOGIES AS DESCRIBED BY CMS-2020-01-R: HCPCS

## 2021-02-18 PROCEDURE — U0005 INFEC AGEN DETEC AMPLI PROBE: HCPCS

## 2021-02-19 ENCOUNTER — TELEPHONE (OUTPATIENT)
Dept: PRIMARY CARE CLINIC | Age: 51
End: 2021-02-19

## 2021-02-22 ENCOUNTER — ANESTHESIA (OUTPATIENT)
Dept: OPERATING ROOM | Age: 51
End: 2021-02-22
Payer: COMMERCIAL

## 2021-02-22 ENCOUNTER — ANESTHESIA EVENT (OUTPATIENT)
Dept: OPERATING ROOM | Age: 51
End: 2021-02-22
Payer: COMMERCIAL

## 2021-02-22 ENCOUNTER — HOSPITAL ENCOUNTER (OUTPATIENT)
Age: 51
Discharge: HOME | End: 2021-02-23
Attending: ORTHOPAEDIC SURGERY | Admitting: ORTHOPAEDIC SURGERY
Payer: COMMERCIAL

## 2021-02-22 ENCOUNTER — APPOINTMENT (OUTPATIENT)
Dept: GENERAL RADIOLOGY | Age: 51
End: 2021-02-22
Attending: ORTHOPAEDIC SURGERY
Payer: COMMERCIAL

## 2021-02-22 VITALS — OXYGEN SATURATION: 100 % | DIASTOLIC BLOOD PRESSURE: 56 MMHG | SYSTOLIC BLOOD PRESSURE: 102 MMHG | TEMPERATURE: 97.5 F

## 2021-02-22 DIAGNOSIS — I10 ESSENTIAL HYPERTENSION: ICD-10-CM

## 2021-02-22 DIAGNOSIS — M50.10 HERNIATION OF CERVICAL INTERVERTEBRAL DISC WITH RADICULOPATHY: Primary | Chronic | ICD-10-CM

## 2021-02-22 PROBLEM — K21.9 GERD (GASTROESOPHAGEAL REFLUX DISEASE): Status: ACTIVE | Noted: 2021-02-22

## 2021-02-22 LAB
GLUCOSE BLD-MCNC: 107 MG/DL (ref 75–110)
GLUCOSE BLD-MCNC: 148 MG/DL (ref 75–110)
GLUCOSE BLD-MCNC: 173 MG/DL (ref 75–110)
GLUCOSE BLD-MCNC: 247 MG/DL (ref 75–110)
GLUCOSE BLD-MCNC: 311 MG/DL (ref 75–110)

## 2021-02-22 PROCEDURE — 3600000014 HC SURGERY LEVEL 4 ADDTL 15MIN: Performed by: ORTHOPAEDIC SURGERY

## 2021-02-22 PROCEDURE — 7100000000 HC PACU RECOVERY - FIRST 15 MIN: Performed by: ORTHOPAEDIC SURGERY

## 2021-02-22 PROCEDURE — 2500000003 HC RX 250 WO HCPCS: Performed by: NURSE ANESTHETIST, CERTIFIED REGISTERED

## 2021-02-22 PROCEDURE — 2709999900 HC NON-CHARGEABLE SUPPLY: Performed by: ORTHOPAEDIC SURGERY

## 2021-02-22 PROCEDURE — 2580000003 HC RX 258: Performed by: ANESTHESIOLOGY

## 2021-02-22 PROCEDURE — 6370000000 HC RX 637 (ALT 250 FOR IP): Performed by: ORTHOPAEDIC SURGERY

## 2021-02-22 PROCEDURE — 3700000001 HC ADD 15 MINUTES (ANESTHESIA): Performed by: ORTHOPAEDIC SURGERY

## 2021-02-22 PROCEDURE — 2780000010 HC IMPLANT OTHER: Performed by: ORTHOPAEDIC SURGERY

## 2021-02-22 PROCEDURE — 2720000010 HC SURG SUPPLY STERILE: Performed by: ORTHOPAEDIC SURGERY

## 2021-02-22 PROCEDURE — 3600000004 HC SURGERY LEVEL 4 BASE: Performed by: ORTHOPAEDIC SURGERY

## 2021-02-22 PROCEDURE — 3209999900 FLUORO FOR SURGICAL PROCEDURES

## 2021-02-22 PROCEDURE — C1713 ANCHOR/SCREW BN/BN,TIS/BN: HCPCS | Performed by: ORTHOPAEDIC SURGERY

## 2021-02-22 PROCEDURE — 83036 HEMOGLOBIN GLYCOSYLATED A1C: CPT

## 2021-02-22 PROCEDURE — 2580000003 HC RX 258: Performed by: ORTHOPAEDIC SURGERY

## 2021-02-22 PROCEDURE — 97161 PT EVAL LOW COMPLEX 20 MIN: CPT

## 2021-02-22 PROCEDURE — 97116 GAIT TRAINING THERAPY: CPT

## 2021-02-22 PROCEDURE — 2500000003 HC RX 250 WO HCPCS: Performed by: ORTHOPAEDIC SURGERY

## 2021-02-22 PROCEDURE — 82947 ASSAY GLUCOSE BLOOD QUANT: CPT

## 2021-02-22 PROCEDURE — 97530 THERAPEUTIC ACTIVITIES: CPT

## 2021-02-22 PROCEDURE — 36415 COLL VENOUS BLD VENIPUNCTURE: CPT

## 2021-02-22 PROCEDURE — 6360000002 HC RX W HCPCS: Performed by: ANESTHESIOLOGY

## 2021-02-22 PROCEDURE — 6360000002 HC RX W HCPCS: Performed by: ORTHOPAEDIC SURGERY

## 2021-02-22 PROCEDURE — 7100000001 HC PACU RECOVERY - ADDTL 15 MIN: Performed by: ORTHOPAEDIC SURGERY

## 2021-02-22 PROCEDURE — 94761 N-INVAS EAR/PLS OXIMETRY MLT: CPT

## 2021-02-22 PROCEDURE — 99253 IP/OBS CNSLTJ NEW/EST LOW 45: CPT | Performed by: INTERNAL MEDICINE

## 2021-02-22 PROCEDURE — 6360000002 HC RX W HCPCS: Performed by: NURSE ANESTHETIST, CERTIFIED REGISTERED

## 2021-02-22 PROCEDURE — 3700000000 HC ANESTHESIA ATTENDED CARE: Performed by: ORTHOPAEDIC SURGERY

## 2021-02-22 DEVICE — PLATE 3001021 ZEVO 21MM 1 LVL
Type: IMPLANTABLE DEVICE | Site: SPINE CERVICAL | Status: FUNCTIONAL
Brand: ZEVO™ ANTERIOR CERVICAL PLATE SYSTEM

## 2021-02-22 DEVICE — GRAFT BNE SUB 1CC CELLULAR MTRX OSTEOCEL +: Type: IMPLANTABLE DEVICE | Site: SPINE CERVICAL | Status: FUNCTIONAL

## 2021-02-22 DEVICE — IMPLANT 6240864 ANATOMIC 16X14X8MM
Type: IMPLANTABLE DEVICE | Site: SPINE CERVICAL | Status: FUNCTIONAL
Brand: VERTE-STACK® SPINAL SYSTEM

## 2021-02-22 RX ORDER — ONDANSETRON 2 MG/ML
INJECTION INTRAMUSCULAR; INTRAVENOUS PRN
Status: DISCONTINUED | OUTPATIENT
Start: 2021-02-22 | End: 2021-02-22 | Stop reason: SDUPTHER

## 2021-02-22 RX ORDER — FENTANYL CITRATE 50 UG/ML
INJECTION, SOLUTION INTRAMUSCULAR; INTRAVENOUS PRN
Status: DISCONTINUED | OUTPATIENT
Start: 2021-02-22 | End: 2021-02-22 | Stop reason: SDUPTHER

## 2021-02-22 RX ORDER — OXYCODONE HYDROCHLORIDE AND ACETAMINOPHEN 5; 325 MG/1; MG/1
2 TABLET ORAL PRN
Status: DISCONTINUED | OUTPATIENT
Start: 2021-02-22 | End: 2021-02-22 | Stop reason: HOSPADM

## 2021-02-22 RX ORDER — SODIUM CHLORIDE 9 MG/ML
INJECTION, SOLUTION INTRAVENOUS CONTINUOUS
Status: DISCONTINUED | OUTPATIENT
Start: 2021-02-22 | End: 2021-02-22

## 2021-02-22 RX ORDER — ROCURONIUM BROMIDE 10 MG/ML
INJECTION, SOLUTION INTRAVENOUS PRN
Status: DISCONTINUED | OUTPATIENT
Start: 2021-02-22 | End: 2021-02-22 | Stop reason: SDUPTHER

## 2021-02-22 RX ORDER — HYDROMORPHONE HCL 110MG/55ML
0.5 PATIENT CONTROLLED ANALGESIA SYRINGE INTRAVENOUS EVERY 5 MIN PRN
Status: DISCONTINUED | OUTPATIENT
Start: 2021-02-22 | End: 2021-02-22 | Stop reason: HOSPADM

## 2021-02-22 RX ORDER — SENNA AND DOCUSATE SODIUM 50; 8.6 MG/1; MG/1
1 TABLET, FILM COATED ORAL 2 TIMES DAILY
Status: DISCONTINUED | OUTPATIENT
Start: 2021-02-22 | End: 2021-02-23 | Stop reason: HOSPADM

## 2021-02-22 RX ORDER — PANTOPRAZOLE SODIUM 40 MG/1
40 TABLET, DELAYED RELEASE ORAL
Status: DISCONTINUED | OUTPATIENT
Start: 2021-02-23 | End: 2021-02-23 | Stop reason: HOSPADM

## 2021-02-22 RX ORDER — SODIUM CHLORIDE 0.9 % (FLUSH) 0.9 %
10 SYRINGE (ML) INJECTION EVERY 12 HOURS SCHEDULED
Status: DISCONTINUED | OUTPATIENT
Start: 2021-02-22 | End: 2021-02-23 | Stop reason: HOSPADM

## 2021-02-22 RX ORDER — PROMETHAZINE HYDROCHLORIDE 12.5 MG/1
12.5 TABLET ORAL EVERY 6 HOURS PRN
Status: DISCONTINUED | OUTPATIENT
Start: 2021-02-22 | End: 2021-02-23 | Stop reason: HOSPADM

## 2021-02-22 RX ORDER — SODIUM CHLORIDE 0.9 % (FLUSH) 0.9 %
10 SYRINGE (ML) INJECTION PRN
Status: DISCONTINUED | OUTPATIENT
Start: 2021-02-22 | End: 2021-02-22 | Stop reason: HOSPADM

## 2021-02-22 RX ORDER — MIDAZOLAM HYDROCHLORIDE 1 MG/ML
INJECTION INTRAMUSCULAR; INTRAVENOUS PRN
Status: DISCONTINUED | OUTPATIENT
Start: 2021-02-22 | End: 2021-02-22 | Stop reason: SDUPTHER

## 2021-02-22 RX ORDER — NICOTINE POLACRILEX 4 MG
15 LOZENGE BUCCAL PRN
Status: DISCONTINUED | OUTPATIENT
Start: 2021-02-22 | End: 2021-02-23 | Stop reason: HOSPADM

## 2021-02-22 RX ORDER — DEXTROSE MONOHYDRATE 50 MG/ML
100 INJECTION, SOLUTION INTRAVENOUS PRN
Status: DISCONTINUED | OUTPATIENT
Start: 2021-02-22 | End: 2021-02-23 | Stop reason: HOSPADM

## 2021-02-22 RX ORDER — POLYETHYLENE GLYCOL 3350 17 G/17G
17 POWDER, FOR SOLUTION ORAL DAILY
Status: DISCONTINUED | OUTPATIENT
Start: 2021-02-22 | End: 2021-02-23 | Stop reason: HOSPADM

## 2021-02-22 RX ORDER — HYDRALAZINE HYDROCHLORIDE 20 MG/ML
5 INJECTION INTRAMUSCULAR; INTRAVENOUS EVERY 10 MIN PRN
Status: DISCONTINUED | OUTPATIENT
Start: 2021-02-22 | End: 2021-02-22 | Stop reason: HOSPADM

## 2021-02-22 RX ORDER — PROPOFOL 10 MG/ML
INJECTION, EMULSION INTRAVENOUS PRN
Status: DISCONTINUED | OUTPATIENT
Start: 2021-02-22 | End: 2021-02-22 | Stop reason: SDUPTHER

## 2021-02-22 RX ORDER — OXYCODONE HYDROCHLORIDE AND ACETAMINOPHEN 5; 325 MG/1; MG/1
2 TABLET ORAL EVERY 4 HOURS PRN
Status: DISCONTINUED | OUTPATIENT
Start: 2021-02-22 | End: 2021-02-23 | Stop reason: HOSPADM

## 2021-02-22 RX ORDER — EPHEDRINE SULFATE/0.9% NACL/PF 50 MG/5 ML
SYRINGE (ML) INTRAVENOUS PRN
Status: DISCONTINUED | OUTPATIENT
Start: 2021-02-22 | End: 2021-02-22 | Stop reason: SDUPTHER

## 2021-02-22 RX ORDER — SODIUM CHLORIDE 0.9 % (FLUSH) 0.9 %
10 SYRINGE (ML) INJECTION EVERY 12 HOURS SCHEDULED
Status: DISCONTINUED | OUTPATIENT
Start: 2021-02-22 | End: 2021-02-22 | Stop reason: HOSPADM

## 2021-02-22 RX ORDER — ONDANSETRON 2 MG/ML
4 INJECTION INTRAMUSCULAR; INTRAVENOUS EVERY 6 HOURS PRN
Status: DISCONTINUED | OUTPATIENT
Start: 2021-02-22 | End: 2021-02-23 | Stop reason: HOSPADM

## 2021-02-22 RX ORDER — LIDOCAINE HYDROCHLORIDE 20 MG/ML
INJECTION, SOLUTION EPIDURAL; INFILTRATION; INTRACAUDAL; PERINEURAL PRN
Status: DISCONTINUED | OUTPATIENT
Start: 2021-02-22 | End: 2021-02-22 | Stop reason: SDUPTHER

## 2021-02-22 RX ORDER — SODIUM CHLORIDE 0.9 % (FLUSH) 0.9 %
10 SYRINGE (ML) INJECTION PRN
Status: DISCONTINUED | OUTPATIENT
Start: 2021-02-22 | End: 2021-02-23 | Stop reason: HOSPADM

## 2021-02-22 RX ORDER — SODIUM CHLORIDE 9 MG/ML
INJECTION, SOLUTION INTRAVENOUS CONTINUOUS
Status: DISCONTINUED | OUTPATIENT
Start: 2021-02-22 | End: 2021-02-23 | Stop reason: HOSPADM

## 2021-02-22 RX ORDER — FENTANYL CITRATE 50 UG/ML
25 INJECTION, SOLUTION INTRAMUSCULAR; INTRAVENOUS EVERY 5 MIN PRN
Status: COMPLETED | OUTPATIENT
Start: 2021-02-22 | End: 2021-02-22

## 2021-02-22 RX ORDER — MORPHINE SULFATE 2 MG/ML
2 INJECTION, SOLUTION INTRAMUSCULAR; INTRAVENOUS
Status: DISCONTINUED | OUTPATIENT
Start: 2021-02-22 | End: 2021-02-23 | Stop reason: HOSPADM

## 2021-02-22 RX ORDER — LISINOPRIL 40 MG/1
40 TABLET ORAL DAILY
Status: DISCONTINUED | OUTPATIENT
Start: 2021-02-22 | End: 2021-02-23 | Stop reason: HOSPADM

## 2021-02-22 RX ORDER — ONDANSETRON 2 MG/ML
4 INJECTION INTRAMUSCULAR; INTRAVENOUS
Status: DISCONTINUED | OUTPATIENT
Start: 2021-02-22 | End: 2021-02-22 | Stop reason: HOSPADM

## 2021-02-22 RX ORDER — OXYCODONE HYDROCHLORIDE AND ACETAMINOPHEN 5; 325 MG/1; MG/1
1 TABLET ORAL PRN
Status: DISCONTINUED | OUTPATIENT
Start: 2021-02-22 | End: 2021-02-22 | Stop reason: HOSPADM

## 2021-02-22 RX ORDER — PREGABALIN 50 MG/1
50 CAPSULE ORAL 2 TIMES DAILY
Status: DISCONTINUED | OUTPATIENT
Start: 2021-02-22 | End: 2021-02-23 | Stop reason: HOSPADM

## 2021-02-22 RX ORDER — LIDOCAINE HYDROCHLORIDE 10 MG/ML
1 INJECTION, SOLUTION EPIDURAL; INFILTRATION; INTRACAUDAL; PERINEURAL
Status: DISCONTINUED | OUTPATIENT
Start: 2021-02-22 | End: 2021-02-22 | Stop reason: HOSPADM

## 2021-02-22 RX ORDER — TIZANIDINE 4 MG/1
4 TABLET ORAL EVERY 8 HOURS PRN
Status: DISCONTINUED | OUTPATIENT
Start: 2021-02-22 | End: 2021-02-23 | Stop reason: HOSPADM

## 2021-02-22 RX ORDER — SODIUM CHLORIDE, SODIUM LACTATE, POTASSIUM CHLORIDE, CALCIUM CHLORIDE 600; 310; 30; 20 MG/100ML; MG/100ML; MG/100ML; MG/100ML
INJECTION, SOLUTION INTRAVENOUS CONTINUOUS
Status: DISCONTINUED | OUTPATIENT
Start: 2021-02-22 | End: 2021-02-22

## 2021-02-22 RX ORDER — DEXTROSE MONOHYDRATE 25 G/50ML
12.5 INJECTION, SOLUTION INTRAVENOUS PRN
Status: DISCONTINUED | OUTPATIENT
Start: 2021-02-22 | End: 2021-02-23 | Stop reason: HOSPADM

## 2021-02-22 RX ORDER — OXYCODONE HYDROCHLORIDE AND ACETAMINOPHEN 5; 325 MG/1; MG/1
1 TABLET ORAL EVERY 4 HOURS PRN
Status: DISCONTINUED | OUTPATIENT
Start: 2021-02-22 | End: 2021-02-23 | Stop reason: HOSPADM

## 2021-02-22 RX ORDER — METOPROLOL TARTRATE 50 MG/1
50 TABLET, FILM COATED ORAL 2 TIMES DAILY
Status: DISCONTINUED | OUTPATIENT
Start: 2021-02-22 | End: 2021-02-23 | Stop reason: HOSPADM

## 2021-02-22 RX ORDER — FENTANYL CITRATE 50 UG/ML
25 INJECTION, SOLUTION INTRAMUSCULAR; INTRAVENOUS EVERY 5 MIN PRN
Status: DISCONTINUED | OUTPATIENT
Start: 2021-02-22 | End: 2021-02-22 | Stop reason: HOSPADM

## 2021-02-22 RX ORDER — LABETALOL HYDROCHLORIDE 5 MG/ML
5 INJECTION, SOLUTION INTRAVENOUS EVERY 10 MIN PRN
Status: DISCONTINUED | OUTPATIENT
Start: 2021-02-22 | End: 2021-02-22 | Stop reason: HOSPADM

## 2021-02-22 RX ORDER — PROMETHAZINE HYDROCHLORIDE 25 MG/ML
6.25 INJECTION, SOLUTION INTRAMUSCULAR; INTRAVENOUS
Status: DISCONTINUED | OUTPATIENT
Start: 2021-02-22 | End: 2021-02-22 | Stop reason: HOSPADM

## 2021-02-22 RX ORDER — DEXAMETHASONE SODIUM PHOSPHATE 10 MG/ML
INJECTION, SOLUTION INTRAMUSCULAR; INTRAVENOUS PRN
Status: DISCONTINUED | OUTPATIENT
Start: 2021-02-22 | End: 2021-02-22 | Stop reason: SDUPTHER

## 2021-02-22 RX ADMIN — OXYCODONE AND ACETAMINOPHEN 2 TABLET: 5; 325 TABLET ORAL at 22:25

## 2021-02-22 RX ADMIN — FENTANYL CITRATE 25 MCG: 50 INJECTION, SOLUTION INTRAMUSCULAR; INTRAVENOUS at 10:24

## 2021-02-22 RX ADMIN — Medication 15 MG: at 07:48

## 2021-02-22 RX ADMIN — POLYETHYLENE GLYCOL 3350 17 G: 17 POWDER, FOR SOLUTION ORAL at 14:51

## 2021-02-22 RX ADMIN — FENTANYL CITRATE 50 MCG: 50 INJECTION, SOLUTION INTRAMUSCULAR; INTRAVENOUS at 09:37

## 2021-02-22 RX ADMIN — ROCURONIUM BROMIDE 15 MG: 10 INJECTION, SOLUTION INTRAVENOUS at 08:25

## 2021-02-22 RX ADMIN — INSULIN LISPRO 8 UNITS: 100 INJECTION, SOLUTION INTRAVENOUS; SUBCUTANEOUS at 18:05

## 2021-02-22 RX ADMIN — FENTANYL CITRATE 25 MCG: 50 INJECTION, SOLUTION INTRAMUSCULAR; INTRAVENOUS at 10:33

## 2021-02-22 RX ADMIN — FENTANYL CITRATE 25 MCG: 50 INJECTION, SOLUTION INTRAMUSCULAR; INTRAVENOUS at 10:55

## 2021-02-22 RX ADMIN — SUGAMMADEX 200 MG: 100 INJECTION, SOLUTION INTRAVENOUS at 09:38

## 2021-02-22 RX ADMIN — SODIUM CHLORIDE: 9 INJECTION, SOLUTION INTRAVENOUS at 12:43

## 2021-02-22 RX ADMIN — Medication 10 MG: at 07:53

## 2021-02-22 RX ADMIN — Medication 15 MG: at 07:58

## 2021-02-22 RX ADMIN — INSULIN LISPRO 2 UNITS: 100 INJECTION, SOLUTION INTRAVENOUS; SUBCUTANEOUS at 13:48

## 2021-02-22 RX ADMIN — OXYCODONE AND ACETAMINOPHEN 2 TABLET: 5; 325 TABLET ORAL at 18:04

## 2021-02-22 RX ADMIN — LISINOPRIL 40 MG: 40 TABLET ORAL at 14:51

## 2021-02-22 RX ADMIN — PREGABALIN 50 MG: 50 CAPSULE ORAL at 21:13

## 2021-02-22 RX ADMIN — ROCURONIUM BROMIDE 50 MG: 10 INJECTION, SOLUTION INTRAVENOUS at 07:35

## 2021-02-22 RX ADMIN — SODIUM CHLORIDE, POTASSIUM CHLORIDE, SODIUM LACTATE AND CALCIUM CHLORIDE: 600; 310; 30; 20 INJECTION, SOLUTION INTRAVENOUS at 08:36

## 2021-02-22 RX ADMIN — PHENYLEPHRINE HYDROCHLORIDE 100 MCG: 10 INJECTION INTRAVENOUS at 09:18

## 2021-02-22 RX ADMIN — ONDANSETRON 4 MG: 2 INJECTION, SOLUTION INTRAMUSCULAR; INTRAVENOUS at 07:41

## 2021-02-22 RX ADMIN — FENTANYL CITRATE 25 MCG: 50 INJECTION, SOLUTION INTRAMUSCULAR; INTRAVENOUS at 10:44

## 2021-02-22 RX ADMIN — CEFAZOLIN 2000 MG: 10 INJECTION, POWDER, FOR SOLUTION INTRAVENOUS at 13:49

## 2021-02-22 RX ADMIN — LIDOCAINE HYDROCHLORIDE 100 MG: 20 INJECTION, SOLUTION EPIDURAL; INFILTRATION; INTRACAUDAL; PERINEURAL at 07:34

## 2021-02-22 RX ADMIN — CEFAZOLIN 2 G: 10 INJECTION, POWDER, FOR SOLUTION INTRAVENOUS at 07:43

## 2021-02-22 RX ADMIN — PREGABALIN 50 MG: 50 CAPSULE ORAL at 14:51

## 2021-02-22 RX ADMIN — METOPROLOL TARTRATE 50 MG: 50 TABLET, FILM COATED ORAL at 21:13

## 2021-02-22 RX ADMIN — PHENYLEPHRINE HYDROCHLORIDE 100 MCG: 10 INJECTION INTRAVENOUS at 09:32

## 2021-02-22 RX ADMIN — TIZANIDINE 4 MG: 4 TABLET ORAL at 11:39

## 2021-02-22 RX ADMIN — FENTANYL CITRATE 100 MCG: 50 INJECTION, SOLUTION INTRAMUSCULAR; INTRAVENOUS at 07:34

## 2021-02-22 RX ADMIN — SODIUM CHLORIDE, POTASSIUM CHLORIDE, SODIUM LACTATE AND CALCIUM CHLORIDE: 600; 310; 30; 20 INJECTION, SOLUTION INTRAVENOUS at 06:38

## 2021-02-22 RX ADMIN — FENTANYL CITRATE 100 MCG: 50 INJECTION, SOLUTION INTRAMUSCULAR; INTRAVENOUS at 07:49

## 2021-02-22 RX ADMIN — INSULIN LISPRO 2 UNITS: 100 INJECTION, SOLUTION INTRAVENOUS; SUBCUTANEOUS at 21:13

## 2021-02-22 RX ADMIN — ROCURONIUM BROMIDE 25 MG: 10 INJECTION, SOLUTION INTRAVENOUS at 08:03

## 2021-02-22 RX ADMIN — DEXAMETHASONE SODIUM PHOSPHATE 10 MG: 10 INJECTION INTRAMUSCULAR; INTRAVENOUS at 07:41

## 2021-02-22 RX ADMIN — PROPOFOL 180 MG: 10 INJECTION, EMULSION INTRAVENOUS at 07:34

## 2021-02-22 RX ADMIN — DOCUSATE SODIUM 50MG AND SENNOSIDES 8.6MG 1 TABLET: 8.6; 5 TABLET, FILM COATED ORAL at 21:13

## 2021-02-22 RX ADMIN — PHENYLEPHRINE HYDROCHLORIDE 100 MCG: 10 INJECTION INTRAVENOUS at 09:26

## 2021-02-22 RX ADMIN — MIDAZOLAM 2 MG: 1 INJECTION INTRAMUSCULAR; INTRAVENOUS at 07:28

## 2021-02-22 RX ADMIN — MORPHINE SULFATE 2 MG: 2 INJECTION, SOLUTION INTRAMUSCULAR; INTRAVENOUS at 13:49

## 2021-02-22 RX ADMIN — Medication 10 MG: at 08:14

## 2021-02-22 RX ADMIN — OXYCODONE AND ACETAMINOPHEN 2 TABLET: 5; 325 TABLET ORAL at 12:42

## 2021-02-22 RX ADMIN — CEFAZOLIN 2000 MG: 10 INJECTION, POWDER, FOR SOLUTION INTRAVENOUS at 22:25

## 2021-02-22 ASSESSMENT — PAIN DESCRIPTION - PROGRESSION
CLINICAL_PROGRESSION: NOT CHANGED
CLINICAL_PROGRESSION: GRADUALLY WORSENING
CLINICAL_PROGRESSION: GRADUALLY WORSENING
CLINICAL_PROGRESSION: NOT CHANGED
CLINICAL_PROGRESSION: NOT CHANGED

## 2021-02-22 ASSESSMENT — PAIN SCALES - GENERAL
PAINLEVEL_OUTOF10: 5
PAINLEVEL_OUTOF10: 6
PAINLEVEL_OUTOF10: 6
PAINLEVEL_OUTOF10: 0
PAINLEVEL_OUTOF10: 6
PAINLEVEL_OUTOF10: 0

## 2021-02-22 ASSESSMENT — PULMONARY FUNCTION TESTS
PIF_VALUE: 1
PIF_VALUE: 1
PIF_VALUE: 24
PIF_VALUE: 5
PIF_VALUE: 19
PIF_VALUE: 19
PIF_VALUE: 23
PIF_VALUE: 1
PIF_VALUE: 22
PIF_VALUE: 23
PIF_VALUE: 22
PIF_VALUE: 16
PIF_VALUE: 23
PIF_VALUE: 24
PIF_VALUE: 23
PIF_VALUE: 23
PIF_VALUE: 22
PIF_VALUE: 23
PIF_VALUE: 23
PIF_VALUE: 16
PIF_VALUE: 23
PIF_VALUE: 22
PIF_VALUE: 13
PIF_VALUE: 22
PIF_VALUE: 19
PIF_VALUE: 24
PIF_VALUE: 23
PIF_VALUE: 22
PIF_VALUE: 23
PIF_VALUE: 22
PIF_VALUE: 23
PIF_VALUE: 24
PIF_VALUE: 20
PIF_VALUE: 24
PIF_VALUE: 23
PIF_VALUE: 23
PIF_VALUE: 22
PIF_VALUE: 24
PIF_VALUE: 18
PIF_VALUE: 22
PIF_VALUE: 23
PIF_VALUE: 19
PIF_VALUE: 23
PIF_VALUE: 2
PIF_VALUE: 16
PIF_VALUE: 2
PIF_VALUE: 22
PIF_VALUE: 23
PIF_VALUE: 1
PIF_VALUE: 24
PIF_VALUE: 22
PIF_VALUE: 19
PIF_VALUE: 19
PIF_VALUE: 24
PIF_VALUE: 22
PIF_VALUE: 22
PIF_VALUE: 23
PIF_VALUE: 23
PIF_VALUE: 1
PIF_VALUE: 1
PIF_VALUE: 23
PIF_VALUE: 22
PIF_VALUE: 22
PIF_VALUE: 24
PIF_VALUE: 23
PIF_VALUE: 23
PIF_VALUE: 22

## 2021-02-22 ASSESSMENT — PAIN DESCRIPTION - ORIENTATION
ORIENTATION: ANTERIOR
ORIENTATION: POSTERIOR
ORIENTATION: ANTERIOR

## 2021-02-22 ASSESSMENT — PAIN DESCRIPTION - LOCATION
LOCATION: NECK

## 2021-02-22 ASSESSMENT — PAIN DESCRIPTION - PAIN TYPE
TYPE: SURGICAL PAIN
TYPE: SURGICAL PAIN

## 2021-02-22 ASSESSMENT — PAIN DESCRIPTION - FREQUENCY: FREQUENCY: CONTINUOUS

## 2021-02-22 ASSESSMENT — PAIN DESCRIPTION - DESCRIPTORS
DESCRIPTORS: SHARP
DESCRIPTORS: ACHING;DISCOMFORT;SORE
DESCRIPTORS: TIGHTNESS

## 2021-02-22 ASSESSMENT — LIFESTYLE VARIABLES: SMOKING_STATUS: 1

## 2021-02-22 NOTE — PROGRESS NOTES
PT STATES HE HAS NUMBNESS AND TINGLING, AND OCCASIONAL PAIN AND WEAKNESS DOWN BOTH ARMS, EQUALLY. STATES IT GETS WORSE WITH ACTIVITY. HAND GRASP STRENGTH STRONG AND EQUAL AT THIS TIME.

## 2021-02-22 NOTE — CARE COORDINATION
Case Management Initial Discharge Plan  Sirisha Vilelgas,             Met with:patient to discuss discharge plans. Information verified: address, contacts, phone number, , insurance Yes  PCP: Harriet Isaac MD  Date of last visit:     Insurance Provider: [employer] [address] [employer]    Discharge Planning    Living Arrangements:  Family Members (mom)  Support Systems:  Family Members    Home has 1 stories  1 stairs to climb to get into front door, 0 stairs to climb to reach second floor  Location of bedroom/bathroom in home  - main floor    Patient able to perform ADL's:Independent    Current Services (outpatient & in home) none  DME equipment: glucometer  DME provider: N/A    Pharmacy: [facility] in [place]    Potential Assistance Needed:  N/A    Patient agreeable to home care: No  [place] of choice provided:  n/a    Prior SNF/Rehab Placement and Facility: No  Agreeable to SNF/Rehab: No  [place] of choice provided: n/a   Evaluation: n/a    Expected Discharge date:     Patient expects to be discharged to:  home  Follow Up Appointment: Best Day/ Time:      Transportation provider: mom  Transportation arrangements needed for discharge: No    Readmission Risk              Risk of Unplanned Readmission:        0             Does patient have a readmission risk score greater than 14?: No  If yes, follow-up appointment must be made within 7 days of discharge. Goal of Care:       Discharge Plan: Met with patient at bedside. Lives with mom, independent and works full time. S/P cervical fusion with Dr. Amarjit Miller. Cervical collar in place. Post op appointment with Dr. Amarjit Miller is 3-9 at [address] [place] [address] at discharge with no needs anticipated.            Electronically signed by Wiley Molina RN on 21 at 2:35 PM EST

## 2021-02-22 NOTE — CONSULTS
[facility]  Office: [address], DO, Kimberly Flores, DO, Maria Isabel Barrett, DO, Victorino Bush, DO, Kerri Tavarez MD, Anjel Naylor MD, Mi Sanders MD, Darnell Collier MD, Peggy Reid MD, Sarahi Sunshine MD, Barb Silver MD, Angelica Rojas MD, Mbonu Mable Nyhan, MD, Lita Hurd DO, Cornelius Moreno MD, Emeli Yao MD, Leonel Mack DO, Jocelyn Salinas MD,  Benjamin Agustin DO, Beto Harris MD, Jesus Sheriff MD, Arpit Rivera, [facility], [facility], CNP, Rudi Florez, CNP, Soheila Guadarrama, CNS, Maddy Bob, CNP, Baron Winters, CNP, Rosa Mi, CNP, Cleveland Coon, CNP, Ivory Kitchen, CNP, Miriam Vo PA-C, Alexandra Donnelly, [facility], Kelsey Bird, CNP, Babak Gaviria, CNP, Javed Villaseñor, CNP, Xavi Reyes, CNP, Isreal Blair, [facility]   [facility] / HISTORY AND PHYSICAL EXAMINATION            Date:   2/22/2021  Patient name:  Selwyn Oscar  Date of admission:  2/22/2021  5:37 AM  MRN:   [id number]  Account:  [de-identified]  YOB: 1970  PCP:    Rocio Paez MD  Room:   2025/2025-01  Code Status:    Full Code    Physician Requesting Consult: Linda Darden MD    Reason for Consult: Postoperative medical management for diabetes, hypertension and other medical problems    Chief Complaint:     CERVICAL DISC HERNIATION C5-6    History Obtained From:     patient, electronic medical record    History of Present Illness:   Mr. Jonetta Aschoff, 81-year-old gentleman has been having numbness of fingers of both hands for a while, he was diagnosed with cervical disc herniation C5-C6 for which he has undergone surgery today with C5-6 cervical discectomy fusion anteriorly. Postoperatively patient doing well except for mild pain at surgical site.   He is a known diabetic and hypertensive which are well controlled on his home medicines, his blood work done on February 1, 2021 was within acceptable range  Past equal air entry, clear to ausculation, no wheezing, rales or rhonchi, normal effort  Cardiovascular: normal rate, regular rhythm, no murmur, gallop, rub. Abdomen: Soft, nontender, nondistended, normal bowel sounds, no hepatomegaly or splenomegaly  Neurologic: There are no new focal motor or sensory deficits, normal muscle tone and bulk, no abnormal sensation, normal speech, cranial nerves II through XII grossly intact  Skin: No gross lesions, rashes, bruising or bleeding on exposed skin area  Extremities:  peripheral pulses palpable, no pedal edema or calf pain with palpation  Psych: normal affect     Investigations:      Laboratory Testing:  Recent Results (from the past 24 hour(s))   POC Glucose Fingerstick    Collection Time: 02/22/21  6:27 AM   Result Value Ref Range    POC Glucose 107 75 - 110 mg/dL   POC Glucose Fingerstick    Collection Time: 02/22/21 10:10 AM   Result Value Ref Range    POC Glucose 148 (H) 75 - 110 mg/dL   POC Glucose Fingerstick    Collection Time: 02/22/21 12:21 PM   Result Value Ref Range    POC Glucose 173 (H) 75 - 110 mg/dL       Imaging/Diagonstics:  No results found. Assessment :      Hospital Problems           Last Modified POA    * (Principal) Herniation of cervical intervertebral disc with radiculopathy (Chronic) 2/22/2021 Yes    Controlled type 2 diabetes mellitus with diabetic polyneuropathy, with long-term current use of insulin ([address] 2/22/2021 Yes    Essential hypertension 2/22/2021 Yes    GERD (gastroesophageal reflux disease) 2/22/2021 Yes          Plan:     1. Continue home dose of lisinopril, metoprolol, Protonix  2. We will keep patient on medium intensity insulin sliding scale, at discharge he can resume his home medicines  3. DVT prophylaxis as per orthopedics plan  4. Pain control as per orthopedics plan  5.  Carb controlled low-salt diet    Consultations:   Bozena Seymour MD  2/22/2021  3:36 PM    Copy sent to Dr. Clark Session Jun Smith MD

## 2021-02-22 NOTE — BRIEF OP NOTE
Brief Postoperative Note      Patient: Boris Chen  YOB: 1970  MRN: [id number]    Date of Procedure: 2/22/2021    Pre-Op Diagnosis: DX CERVICAL DISC HERNIATION C5-6    Post-Op Diagnosis: Same       Procedure(s):  C 5-6 CERVICAL DISCECTOMY FUSION ANTERIOR - MEDTRONICS    Surgeon(s):  Mariangel Larson MD    Assistant:  First Assistant: Oswaldo Bob RN    Anesthesia: General    Estimated Blood Loss (mL): less than 50     Complications: None    Specimens:   * No specimens in log *    Implants:  Implant Name Type Inv. Item Serial No.  Lot No. LRB No. Used Action   GRAFT BNE SUB 1CC CELLULAR MTRX OSTEOCEL + - [id number]  GRAFT BNE SUB 1CC CELLULAR MTRX OSTEOCEL + [id number] [employer]WD [id number] N/A 1 Implanted   IMPL CAGE SPINE ANATOMIC PEEK [id number] Spine IMPL CAGE SPINE ANATOMIC PEEK [id number]  [employer] [place] [employer]PMM [id number] N/A 1 Implanted   PLATE SPNL [id number] LEV 1 ANT CERV TI ZEVO  PLATE SPNL [id number] LEV 1 ANT CERV TI ZEVO  [employer] SOFAMOR DANEK-WD  N/A 1 Implanted   SCREW SPNL L15MM DIA3. 5MM ANT CERV TI SELF DRL JOSE ANG  SCREW SPNL L15MM DIA3. 5MM ANT CERV TI SELF DRL JOSE ANG  [employer] SOFAMOR DANEK-WD  N/A 4 Implanted         Drains: * No LDAs found *    Electronically signed by Cecilie [place], MD on 2/22/2021 at 9:48 AM

## 2021-02-22 NOTE — PROGRESS NOTES
Per [facility] approved formulary policy, SGLT2 Inhibitors are not stocked or supplied for our inpatients. Please note that the  Dapagliflozin [employer]) is non-formulary and has been discontinued while inpatient. If you feel the patient needs to continue their home therapy during the inpatient stay, the patient may bring their medication bottle for verification and administration pursuant to our home medication use policy. You may continue this at discharge. Please contact the pharmacy with any questions or concerns. Thank you.   Liv Jeffers, LÁZAROh2/22/2021 1:55 PM

## 2021-02-22 NOTE — PROGRESS NOTES
Pt admitted to room 2025 from PACU  Oriented to room and call light/tv controls. Bed in lowest position, wheels locked, 2/4 side rails up  Call light in reach, room free of clutter, adequate lighting provided.

## 2021-02-22 NOTE — PROGRESS NOTES
Physical Therapy    Facility/Department: [facility] MED SURG  Initial Assessment    NAME: Rita Izquierdo  : 1970  MRN: [id number]    Date of Service: 2021    Discharge Recommendations:  Home with assist PRN       Pt presented to surgery on 21 for:    C 5-6 CERVICAL DISCECTOMY & Ryan C5-6    RN reports patient is medically stable for therapy treatment this date. Chart reviewed prior to treatment and patient is agreeable for therapy. Assessment   Body structures, Functions, Activity limitations: Decreased functional mobility ; Decreased endurance;Decreased posture  Assessment: Pt tolerated session well with minimal deficits noted in bed mobility, transfers, ambulation, balance, and endurance this session. Pt to benefit with cont'd PT for functional mobility, gait progression, & for pt education. Pt is appropriate to D/C home with assist at D/C  Specific instructions for Next Treatment: progress ambulation  Prognosis: Good  Decision Making: Medium Complexity  Exam: ROM, MMT, functional mobility, activity tolerance, Balance, & [employer] AM-PAC 6 Clicks Basic Mobility  Clinical Presentation: evolving  PT Education: Goals;PT Role;Precautions; Functional Mobility Training;Transfer Training;Gait Training  Patient Education: Ed spinal precautions, wearing of cervical collar at all times except for bathing & dressing, posture, energy conservation & safety principles, prevention sedentary complications & home walking program  REQUIRES PT FOLLOW UP: Yes  Activity Tolerance  Activity Tolerance: Patient limited by pain       Patient Diagnosis(es): There were no encounter diagnoses. has a past medical history of Diabetes mellitus ([facility] GERD (gastroesophageal reflux disease), and Hypertension. has a past surgical history that includes Cardiac surgery; Throat surgery (2020); Shoulder Arthroplasty (Bilateral); Knee arthroscopy (Bilateral);  Arm Surgery (Left); and cervical fusion (N/A, 2/22/2021).     Restrictions  Restrictions/Precautions  Restrictions/Precautions: General Precautions, Fall Risk  Required Braces or Orthoses?: Yes  Required Braces or Orthoses  Cervical: c-collar  Position Activity Restriction  Other position/activity restrictions: ambulate, activity as tolerated, pt to wear cervical collar, dysphagia precautions, LUE IV  Vision/Hearing  Vision: Impaired  Vision Exceptions: Wears glasses for reading  Hearing: Within functional limits     Subjective  General  Chart Reviewed: Yes  Patient assessed for rehabilitation services?: Yes  Additional Pertinent Hx: DM, GERD, HTN  Response To Previous Treatment: Not applicable  General Comment  Comments: RN horace PT  Subjective  Subjective: Pt agreeable to PT  Pain Screening  Patient Currently in Pain: Yes  Pain Assessment  Pain Assessment: 0-10(8/10)  Pain Type: Surgical pain  Pain Location: Neck  Vital Signs  Patient Currently in Pain: Yes       Orientation  Orientation  Overall Orientation Status: Within Normal Limits  Social/Functional History  Social/Functional History  Lives With: Significant other  Type of Home: House  Home Layout: One level  Home Access: Stairs to enter without rails(one small step)  Entrance Stairs - Number of Steps: 1  Bathroom Shower/Tub: Walk-in shower  Bathroom Toilet: Standard  Bathroom Equipment: Built-in shower seat  ADL Assistance: Independent  Homemaking Assistance: Independent  Homemaking Responsibilities: Yes  Ambulation Assistance: Independent  Transfer Assistance: Independent  Active : Yes  Occupation: Full time employment  Type of occupation:   Additional Comments: NO FALLS  Cognition   Cognition  Overall Cognitive Status: WFL    Objective     Observation/Palpation  Posture: Fair  Observation: resting in bed, wearing cervical collar    AROM RLE (degrees)  RLE AROM: WFL  AROM LLE (degrees)  LLE AROM : WFL  AROM RUE (degrees)  RUE AROM : WFL  AROM LUE (degrees)  LUE AROM : WFL  Strength RLE  Strength RLE: WFL  Strength LLE  Strength LLE: WFL  Strength RUE  Strength RUE: WFL  Comment: deferred shoulder  Strength LUE  Strength LUE: WFL  Comment: deferred shoulder  Tone RLE  RLE Tone: Normotonic  Coordination  Movements Are Fluid And Coordinated: Yes  Sensation  Overall Sensation Status: WFL  Bed mobility  Rolling to Left: Supervision  Supine to Sit: Supervision  Sit to Supine: Supervision  Scooting: Supervision  Comment: good use of UB to assist+  Transfers  Sit to Stand: Contact guard assistance  Stand to sit: Contact guard assistance  Bed to Chair: Contact guard assistance  Lateral Transfers: Stand by assistance  Comment: Ed on correct use of upper body support for safe sit/stand  Ambulation  Ambulation?: Yes  Ambulation 1  Surface: level tile  Device: No Device  Assistance: Contact guard assistance  Quality of Gait: step through pattern, moving slow & guarded  Gait Deviations: Slow Rima  Distance: 15ft    Ambulation 2  Surface: level tile  Device: No Device  Assistance: Contact guard assistance  Quality of Gait: step through pattern, moving slow & guarded  Gait Deviations: Slow Rima  Distance: 30ft  Balance  Sitting - Static: Good  Sitting - Dynamic: Good  Standing - Static: Good;-  Standing - Dynamic: Good;-  Exercises  Comments: Ed spinal precautions, wearing of cervical collar at all times except for bathing & dressing, posture, energy conservation & safety principles, prevention sedentary complications & home walking program    All lines intact, call light within reach, and patient positioned comfortably at end of treatment. All patient needs addressed prior to ending therapy session.         Plan   Plan  Times per week: 1-2x/D, 5-6D/week  Specific instructions for Next Treatment: progress ambulation  Safety Devices  Type of devices: Gait belt, Patient at risk for falls, Left in chair, Nurse notified    G-Code       OutComes Score AM-PAC Score  AM-PAC Inpatient Mobility Raw Score : 18 (02/22/21 1403)  AM-PAC Inpatient T-Scale Score : 43.63 (02/22/21 1403)  Mobility Inpatient CMS 0-100% Score: 46.58 (02/22/21 1403)  Mobility Inpatient CMS G-Code Modifier : CK (02/22/21 1403)          Goals  Short term goals  Time Frame for Short term goals: 3 visits  Short term goal 1: Inc bed-mobility & transfers to independent to enable pt to safely get in/OOB  Short term goal 2:  Inc gait to amb 400 ft or > indep to enable pt to return to previous level of independence & promotion of home walking program  Short term goal 3: Ed spinal precautions, posture, energy conservation & safety principles, prevention sedentary complications & home walking program, & issue written pt education       Therapy Time   Individual Concurrent Group Co-treatment   Time In 1328         Time Out 1402         Minutes 34+10=44              Additional 10 minutes for chart review          [address], PT

## 2021-02-22 NOTE — ANESTHESIA PRE PROCEDURE
Department of Anesthesiology  Preprocedure Note       Name:  Destiny Lopez   Age:  53 y.o.  :  1970                                          MRN:  [id number]         Date:  2021      Surgeon: Alirio Cho):  Cheikh Mariscal MD    Procedure: Procedure(s):  C 5-6 CERVICAL DISCECTOMY FUSION ANTERIOR - [employer]ICS    Medications prior to admission:   Prior to Admission medications    Medication Sig Start Date End Date Taking? Authorizing Provider   omeprazole (PRILOSEC) 20 MG delayed release capsule Take 20 mg by mouth daily   Yes Historical Provider, MD   pregabalin (LYRICA) 50 MG capsule Take 50 mg by mouth 2 times daily.    Yes Historical Provider, MD   dapagliflozin (FARXIGA) 10 MG tablet Take 10 mg by mouth every morning   Yes Historical Provider, MD   aspirin 81 MG chewable tablet Take 81 mg by mouth daily   Yes Historical Provider, MD   metFORMIN (GLUCOPHAGE) 500 MG tablet Take 500 mg by mouth 2 times daily (with meals)   Yes Historical Provider, MD   lisinopril (PRINIVIL;ZESTRIL) 40 MG tablet Take 40 mg by mouth daily   Yes Historical Provider, MD   Insulin Aspart (NOVOLOG SC) Inject into the skin   Yes Historical Provider, MD   metoprolol tartrate (LOPRESSOR) 50 MG tablet Take 50 mg by mouth 2 times daily   Yes Historical Provider, MD       Current medications:    Current Facility-Administered Medications   Medication Dose Route Frequency Provider Last Rate Last Admin    lactated ringers infusion   Intravenous Continuous Sterling Mayorga  mL/hr at 21 0638 New Bag at 21 6890    sodium chloride flush 0.9 % injection 10 mL  10 mL Intravenous 2 times per day Sterling Mayorga MD        sodium chloride flush 0.9 % injection 10 mL  10 mL Intravenous PRN Sterling Mayorga MD        lidocaine PF 1 % injection 1 mL  1 mL Intradermal Once PRN Sterling Mayorga MD         Facility-Administered Medications Ordered in Other Encounters   Medication Dose Route Frequency Provider Last Rate Last Admin    midazolam (VERSED) injection    PRN Aditya Bumpers, APRN - CRNA   2 mg at 02/22/21 8453    fentaNYL (SUBLIMAZE) injection    PRN Aditya Bumpers, APRN - CRNA   100 mcg at 02/22/21 0749    lidocaine PF 2 % injection    PRN Aditya Bumpers, APRN - CRNA   100 mg at 02/22/21 6739    propofol injection    PRN Aditya Bumpers, APRN - CRNA   180 mg at 02/22/21 0734    rocuronium [facility]) injection    PRN Aditya Bumpers, APRN - CRNA   50 mg at 02/22/21 0735    dexamethasone (PF) (DECADRON) injection    PRN Aditya Bumpers, APRN - CRNA   10 mg at 02/22/21 0741    ondansetron (ZOFRAN) injection    PRN Aditya Bumpers, APRN - CRNA   4 mg at 02/22/21 0741    ePHEDrine injection    PRN Aditya Bumpers, APRN - CRNA   10 mg at 02/22/21 5575       Allergies:  No Known Allergies    Problem List:    Patient Active Problem List   Diagnosis Code    Acute hematogenous osteomyelitis of right foot ([facility] [address] M86.071    Diabetic ulcer of toe of right foot ([facility] [address] E11.621, L97.519    MRSA (methicillin resistant staph aureus) culture positive Z22.322    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    Group C streptococcal infection A49.1    Controlled type 2 diabetes mellitus with diabetic polyneuropathy, with long-term current use of insulin ([facility] [address] E11.42, Z79.4       Past Medical History:        Diagnosis Date    Diabetes mellitus ([facility] [address]     GERD (gastroesophageal reflux disease)     Hypertension        Past Surgical History:        Procedure Laterality Date    ARM SURGERY Left     ulnar nerve release    CARDIAC SURGERY      cardiac catheterization    KNEE ARTHROSCOPY Bilateral     SHOULDER ARTHROPLASTY Bilateral     THROAT SURGERY  2020       Social History:    Social History     Tobacco Use    Smoking status: Current Every Day Smoker     Types: Cigarettes    Smokeless tobacco: Never Used   Substance Use Topics    Alcohol use: Yes     Comment: occcasionally                                 Ready to quit: Not Answered  Counseling given: Not Answered      Vital Signs (Current):   Vitals:    02/22/21 0550 02/22/21 0606   BP: 101/60    Pulse: 71    Resp: 20    Temp: 97.8 °F (36.6 °C)    TempSrc: Temporal    SpO2: 97%    Weight:  255 lb (115.7 kg)   Height:  6' 1\" (1.854 m)                                              BP Readings from Last 3 Encounters:   02/22/21 101/60   02/22/21 (!) 53/31   02/01/21 (!) 140/69       NPO Status: Time of last liquid consumption: 2100                        Time of last solid consumption: 1830                        Date of last liquid consumption: 02/21/21                        Date of last solid food consumption: 02/21/21    BMI:   Wt Readings from Last 3 Encounters:   02/22/21 255 lb (115.7 kg)   02/01/21 255 lb (115.7 kg)   04/30/19 248 lb (112.5 kg)     Body mass index is 33.64 kg/m².     CBC:   Lab Results   Component Value Date    WBC 16.1 02/01/2021    RBC 5.49 02/01/2021    HGB 15.7 02/01/2021    HCT 50.5 02/01/2021    MCV 92.0 02/01/2021    RDW 13.5 02/01/2021     02/01/2021       CMP:   Lab Results   Component Value Date     02/01/2021    K 4.2 02/01/2021     02/01/2021    CO2 27 02/01/2021    BUN 13 02/01/2021    CREATININE 0.99 02/01/2021    GFRAA >60 02/01/2021    LABGLOM >60 02/01/2021    GLUCOSE 57 02/01/2021    CALCIUM 9.9 02/01/2021       POC Tests:   Recent Labs     02/22/21  0627   POCGLU 107       Coags:   Lab Results   Component Value Date    PROTIME 13.0 02/01/2021    INR 1.0 02/01/2021    APTT 31.0 02/01/2021       HCG (If Applicable): No results found for: PREGTESTUR, PREGSERUM, HCG, HCGQUANT     ABGs: No results found for: PHART, PO2ART, PWP7AZT, UVF5BCU, BEART, Q5ITMVOC     Type & Screen (If Applicable):  No results found for: LABABO, LABRH    Drug/Infectious Status (If Applicable):  No results found for: HIV, HEPCAB    COVID-19 Screening (If Applicable):   Lab Results   Component Value Date    COVID19 Not Detected 02/18/2021 Anesthesia Evaluation  Patient summary reviewed and Nursing notes reviewed no history of anesthetic complications:   Airway: Mallampati: II  TM distance: >3 FB   Neck ROM: full  Mouth opening: > = 3 FB Dental: normal exam         Pulmonary:normal exam    (+) current smoker    (-) COPD and asthma                           Cardiovascular:  Exercise tolerance: no interval change,   (+) hypertension:,     (-) past MI, CAD and CABG/stent        Rate: normal                    Neuro/Psych:      (-) TIA and CVA           GI/Hepatic/Renal:   (+) GERD: well controlled,           Endo/Other:    (+) Diabetes, : arthritis:., .                 Abdominal:           Vascular:                                        Anesthesia Plan      general     ASA 3       Induction: intravenous. Anesthetic plan and risks discussed with patient. Plan discussed with CRNA.     Attending anesthesiologist reviewed and agrees with Pre Eval content              Cristiana Reese DO   2/22/2021

## 2021-02-22 NOTE — PLAN OF CARE
Pain level assessment complete.    Patient educated on pain scale and control interventions  PRN pain medication given per patient request  Patient instructed to call out with new onset of pain or unrelieved pain   Percoset q 4 hours prn

## 2021-02-22 NOTE — H&P
History and Physical Update    Pt Name: Tima Navarro  MRN: [id number]  YOB: 1970  Date of evaluation: 2/22/2021      [x] I have reviewed the office note found in the pt's paper chart by Dr. Kathe Singh from 01/28/2021 which meets the criteria for an Interval History and Physical note. [x] I have examined  Tima Navarro a 53 y.o., male who is scheduled for an anterior C5-6 cervical discectomy fusion by Dr. Kathe Singh due to cervical disc herniation. The patient denies health changes since his appointment with Dr. Kathe Singh on 01/28/2021. Pt denies fever, chills, productive cough, SOB, chest pain, and rashes. History of diabetes. Pt's POC blood glucose today is 107. Aspirin was last taken 2 weeks ago. History of a right hallux infection for the past 2 years per pt. The pt has an ulcer on the plantar aspect of the right hallux. Pt states he followed-up with Dr. Sherrill Morris from podiatry 2 weeks ago and has another appointment with him at the end of this week. WBC count was 16.1 k/uL on 02/01/2021. Pt states he last took an antibiotic 1 1/2 months ago. Dr. Kathe Singh is aware of the ulcer. Vital signs: /60   Pulse 71   Temp 97.8 °F (36.6 °C) (Temporal)   Resp 20   Ht 6' 1\" (1.854 m)   Wt 255 lb (115.7 kg)   SpO2 97%   BMI 33.64 kg/m²      Allergies:  Patient has no known allergies. Past medical history, surgical history, social history, and family history were reviewed and updated in EPIC as indicated. Medications:    Prior to Admission medications    Medication Sig Start Date End Date Taking? Authorizing Provider   omeprazole (PRILOSEC) 20 MG delayed release capsule Take 20 mg by mouth daily   Yes Historical Provider, MD   pregabalin (LYRICA) 50 MG capsule Take 50 mg by mouth 2 times daily.    Yes Historical Provider, MD   dapagliflozin (FARXIGA) 10 MG tablet Take 10 mg by mouth every morning   Yes Historical Provider, MD   aspirin 81 MG chewable tablet Take 81 mg by mouth daily   Yes Historical Provider, MD   metFORMIN (GLUCOPHAGE) 500 MG tablet Take 500 mg by mouth 2 times daily (with meals)   Yes Historical Provider, MD   lisinopril (PRINIVIL;ZESTRIL) 40 MG tablet Take 40 mg by mouth daily   Yes Historical Provider, MD   Insulin Aspart (NOVOLOG SC) Inject into the skin   Yes Historical Provider, MD   metoprolol tartrate (LOPRESSOR) 50 MG tablet Take 50 mg by mouth 2 times daily   Yes Historical Provider, MD       This is a 53 y.o. male who is pleasant, cooperative, alert and oriented x 3, in no acute distress. Obese. Heart: Regular rate and rhythm without murmur, gallop, or rub. Lungs: Normal respiratory effort, unlabored, and clear to auscultation without wheezes or rales bilaterally. Abdomen: Soft, nontender, nondistended with active bowel sounds. Pedal pulses: 2+ bilaterally. Closed, non-draining ulcer on the plantar aspect of the right hallux. No erythema surrounding the ulcer.          Labs:  Recent Labs     02/01/21  0730   HGB 15.7   HCT 50.5*   WBC 16.1*   MCV 92.0         K 4.2      CO2 27   BUN 13   CREATININE 0.99   GLUCOSE 57*   INR 1.0   PROTIME 13.0   APTT 31.0       Recent Labs     02/18/21  0720   COVID19       Not Detected         MORIS Darling  Electronically signed 2/22/2021 at 6:49 AM

## 2021-02-22 NOTE — PROGRESS NOTES
Pt. To floor, requesting [facility] status. [facility] status explained to pt who states he understands what that is and would like that put into place. Dr. Matthew Nunn in surgery, nurse will call back.

## 2021-02-23 VITALS
SYSTOLIC BLOOD PRESSURE: 136 MMHG | TEMPERATURE: 97.7 F | BODY MASS INDEX: 33.8 KG/M2 | HEIGHT: 73 IN | WEIGHT: 255 LBS | HEART RATE: 75 BPM | DIASTOLIC BLOOD PRESSURE: 63 MMHG | OXYGEN SATURATION: 98 % | RESPIRATION RATE: 16 BRPM

## 2021-02-23 LAB
ANION GAP SERPL CALCULATED.3IONS-SCNC: 9 MMOL/L (ref 9–17)
BUN BLDV-MCNC: 19 MG/DL (ref 6–20)
BUN/CREAT BLD: 18 (ref 9–20)
CALCIUM SERPL-MCNC: 9.3 MG/DL (ref 8.6–10.4)
CHLORIDE BLD-SCNC: 99 MMOL/L (ref 98–107)
CO2: 26 MMOL/L (ref 20–31)
CREAT SERPL-MCNC: 1.03 MG/DL (ref 0.7–1.2)
ESTIMATED AVERAGE GLUCOSE: 160 MG/DL
GFR AFRICAN AMERICAN: >60 ML/MIN
GFR NON-AFRICAN AMERICAN: >60 ML/MIN
GFR SERPL CREATININE-BSD FRML MDRD: ABNORMAL ML/MIN/{1.73_M2}
GFR SERPL CREATININE-BSD FRML MDRD: ABNORMAL ML/MIN/{1.73_M2}
GLUCOSE BLD-MCNC: 247 MG/DL (ref 75–110)
GLUCOSE BLD-MCNC: 264 MG/DL (ref 70–99)
HBA1C MFR BLD: 7.2 % (ref 4–6)
HCT VFR BLD CALC: 45.9 % (ref 40.7–50.3)
HEMOGLOBIN: 14.2 G/DL (ref 13–17)
MCH RBC QN AUTO: 28.7 PG (ref 25.2–33.5)
MCHC RBC AUTO-ENTMCNC: 30.9 G/DL (ref 28.4–34.8)
MCV RBC AUTO: 92.9 FL (ref 82.6–102.9)
NRBC AUTOMATED: 0 PER 100 WBC
PDW BLD-RTO: 13.4 % (ref 11.8–14.4)
PLATELET # BLD: 219 K/UL (ref 138–453)
PMV BLD AUTO: 11.2 FL (ref 8.1–13.5)
POTASSIUM SERPL-SCNC: 4.9 MMOL/L (ref 3.7–5.3)
RBC # BLD: 4.94 M/UL (ref 4.21–5.77)
SODIUM BLD-SCNC: 134 MMOL/L (ref 135–144)
WBC # BLD: 21.2 K/UL (ref 3.5–11.3)

## 2021-02-23 PROCEDURE — 36415 COLL VENOUS BLD VENIPUNCTURE: CPT

## 2021-02-23 PROCEDURE — 85027 COMPLETE CBC AUTOMATED: CPT

## 2021-02-23 PROCEDURE — 2580000003 HC RX 258: Performed by: ORTHOPAEDIC SURGERY

## 2021-02-23 PROCEDURE — 80048 BASIC METABOLIC PNL TOTAL CA: CPT

## 2021-02-23 PROCEDURE — 6360000002 HC RX W HCPCS: Performed by: ORTHOPAEDIC SURGERY

## 2021-02-23 PROCEDURE — 97530 THERAPEUTIC ACTIVITIES: CPT

## 2021-02-23 PROCEDURE — 6370000000 HC RX 637 (ALT 250 FOR IP): Performed by: ORTHOPAEDIC SURGERY

## 2021-02-23 PROCEDURE — 82947 ASSAY GLUCOSE BLOOD QUANT: CPT

## 2021-02-23 RX ORDER — SENNA AND DOCUSATE SODIUM 50; 8.6 MG/1; MG/1
1 TABLET, FILM COATED ORAL 2 TIMES DAILY
Qty: 60 TABLET | Refills: 0 | Status: SHIPPED | OUTPATIENT
Start: 2021-02-23 | End: 2021-02-23

## 2021-02-23 RX ORDER — OXYCODONE HYDROCHLORIDE AND ACETAMINOPHEN 5; 325 MG/1; MG/1
1-2 TABLET ORAL EVERY 4 HOURS PRN
Qty: 60 TABLET | Refills: 0 | Status: SHIPPED | OUTPATIENT
Start: 2021-02-23 | End: 2021-02-23

## 2021-02-23 RX ORDER — OXYCODONE HYDROCHLORIDE AND ACETAMINOPHEN 5; 325 MG/1; MG/1
1-2 TABLET ORAL EVERY 4 HOURS PRN
Qty: 60 TABLET | Refills: 0 | Status: SHIPPED | OUTPATIENT
Start: 2021-02-23 | End: 2021-03-02

## 2021-02-23 RX ORDER — TIZANIDINE 4 MG/1
4 TABLET ORAL EVERY 8 HOURS PRN
Qty: 50 TABLET | Refills: 0 | Status: SHIPPED | OUTPATIENT
Start: 2021-02-23

## 2021-02-23 RX ORDER — SENNA AND DOCUSATE SODIUM 50; 8.6 MG/1; MG/1
1 TABLET, FILM COATED ORAL 2 TIMES DAILY
Qty: 60 TABLET | Refills: 0 | Status: SHIPPED | OUTPATIENT
Start: 2021-02-23

## 2021-02-23 RX ORDER — TIZANIDINE 4 MG/1
4 TABLET ORAL EVERY 8 HOURS PRN
Qty: 50 TABLET | Refills: 0 | Status: SHIPPED | OUTPATIENT
Start: 2021-02-23 | End: 2021-02-23

## 2021-02-23 RX ADMIN — OXYCODONE AND ACETAMINOPHEN 2 TABLET: 5; 325 TABLET ORAL at 02:47

## 2021-02-23 RX ADMIN — PREGABALIN 50 MG: 50 CAPSULE ORAL at 08:05

## 2021-02-23 RX ADMIN — LISINOPRIL 40 MG: 40 TABLET ORAL at 08:05

## 2021-02-23 RX ADMIN — INSULIN LISPRO 4 UNITS: 100 INJECTION, SOLUTION INTRAVENOUS; SUBCUTANEOUS at 08:05

## 2021-02-23 RX ADMIN — DOCUSATE SODIUM 50MG AND SENNOSIDES 8.6MG 1 TABLET: 8.6; 5 TABLET, FILM COATED ORAL at 08:05

## 2021-02-23 RX ADMIN — OXYCODONE AND ACETAMINOPHEN 2 TABLET: 5; 325 TABLET ORAL at 06:45

## 2021-02-23 RX ADMIN — MORPHINE SULFATE 2 MG: 2 INJECTION, SOLUTION INTRAMUSCULAR; INTRAVENOUS at 00:49

## 2021-02-23 RX ADMIN — SODIUM CHLORIDE, PRESERVATIVE FREE 10 ML: 5 INJECTION INTRAVENOUS at 08:09

## 2021-02-23 RX ADMIN — POLYETHYLENE GLYCOL 3350 17 G: 17 POWDER, FOR SOLUTION ORAL at 08:06

## 2021-02-23 RX ADMIN — PANTOPRAZOLE SODIUM 40 MG: 40 TABLET, DELAYED RELEASE ORAL at 06:45

## 2021-02-23 RX ADMIN — METOPROLOL TARTRATE 50 MG: 50 TABLET, FILM COATED ORAL at 08:06

## 2021-02-23 ASSESSMENT — PAIN DESCRIPTION - LOCATION
LOCATION: NECK

## 2021-02-23 ASSESSMENT — PAIN DESCRIPTION - FREQUENCY
FREQUENCY: CONTINUOUS

## 2021-02-23 ASSESSMENT — PAIN SCALES - GENERAL
PAINLEVEL_OUTOF10: 6
PAINLEVEL_OUTOF10: 6
PAINLEVEL_OUTOF10: 7
PAINLEVEL_OUTOF10: 8

## 2021-02-23 ASSESSMENT — PAIN DESCRIPTION - ORIENTATION: ORIENTATION: ANTERIOR

## 2021-02-23 ASSESSMENT — PAIN DESCRIPTION - DESCRIPTORS: DESCRIPTORS: ACHING;DISCOMFORT;SORE

## 2021-02-23 ASSESSMENT — PAIN DESCRIPTION - PAIN TYPE: TYPE: SURGICAL PAIN

## 2021-02-23 NOTE — PROGRESS NOTES
Physical Therapy  Facility/Department: [facility] MED SURG  Daily Treatment Note  NAME: Rafael Robison  : 1970  MRN: [id number]    Date of Service: 2021    Discharge Recommendations:  Home with assist PRN   Assessment   Body structures, Functions, Activity limitations: Decreased functional mobility ; Decreased endurance;Decreased posture  Assessment: Pt tolerated session well with minimal deficits noted in ambulation, balance, and endurance this session. Pt to benefit with cont'd PT for functional mobility, gait progression, & for pt education. Pt is appropriate to D/C home with assist at D/C  Prognosis: Good  Decision Making: Medium Complexity  PT Education: General Safety; Disease Specific Education;Precautions; Functional Mobility Training  Patient Education: Ed spinal precautions, wearing of cervical collar at all times except for bathing & dressing, posture, energy conservation & safety principles, prevention sedentary complications & home walking program  REQUIRES PT FOLLOW UP: Yes  Activity Tolerance  Activity Tolerance: Patient Tolerated treatment well     Patient Diagnosis(es): The primary encounter diagnosis was Herniation of cervical intervertebral disc with radiculopathy. A diagnosis of Essential hypertension was also pertinent to this visit. has a past medical history of Diabetes mellitus ([address] GERD (gastroesophageal reflux disease), and Hypertension. has a past surgical history that includes Cardiac surgery; Throat surgery (); Shoulder Arthroplasty (Bilateral); Knee arthroscopy (Bilateral); Arm Surgery (Left); and cervical fusion (N/A, 2021).     Restrictions  Restrictions/Precautions  Restrictions/Precautions: General Precautions, Fall Risk  Required Braces or Orthoses?: Yes  Required Braces or Orthoses  Cervical: c-collar  Position Activity Restriction  Other position/activity restrictions: up as tolerated, pt to wear cervical collar  Subjective   General  Chart Reviewed: Yes  Additional Pertinent Hx: DM, GERD, HTN  General Comment  Comments: Elly Morfin, RN reports pt medically stable for treatment at this date. Orientation  Orientation  Overall Orientation Status: Within Normal Limits  Objective   Bed mobility  Comment: Unable to assess as pt was out in hallway ambulating on own upon arrival.  Transfers  Comment: Unable to assess at this date. pt has been up as ad sina, left pt. up in room IND. Ambulation  Ambulation?: Yes  Ambulation 1  Surface: level tile  Device: No Device  Assistance: Independent  Quality of Gait: step through pattern, moving slow and guarded. Gait Deviations: Increased TUCKER; Slow Rima  Distance: 205' x 1  Stairs/Curb  Stairs?: Yes  Stairs  # Steps : 5  Stairs Height: 6\"  Rails: Bilateral  Device: No Device  Assistance: Independent  Comment: Pt. able to perform stairs IND. Balance  Posture: Good  Standing - Static: Fair  Standing - Dynamic: Good  Single Leg Stance R Le  Single Leg Stance L Le  Comments: Pt. states \"unable to perform SLS test normally\"  OutComes Score  Balance Score: 15 (21)  Gait Score: 12 (21)        Tinetti Total Score: 27 (21)    AM-PAC Score  AM-PAC Inpatient Mobility Raw Score : 23 (21)  AM-PAC Inpatient T-Scale Score : 56.93 (21)  Mobility Inpatient CMS 0-100% Score: 11.2 (21)  Mobility Inpatient CMS G-Code Modifier : CI (21)    Goals  Short term goals  Time Frame for Short term goals: 3 visits  Short term goal 1: Inc bed-mobility & transfers to independent to enable pt to safely get in/OOB  Short term goal 2:  Inc gait to amb 400 ft or > indep to enable pt to return to previous level of independence & promotion of home walking program  Short term goal 3: Ed spinal precautions, posture, energy conservation & safety principles, prevention sedentary complications & home walking program, & issue written pt education    Plan    Plan  Times per week: 1-2x/D, 5-6D/week  Specific instructions for Next Treatment: progress ambulation  Safety Devices  Type of devices:  All fall risk precautions in place, Nurse notified     Therapy Time   Individual Concurrent Group Co-treatment   Time In [facility]         Time Out 0905         Minutes Artur Student Physical Therapist Assistant

## 2021-02-23 NOTE — OP NOTE
Operative Note      Patient: Tamia Schilling  YOB: 1970  MRN: [id number]    Date of Procedure: 2/22/2021    Pre-Op Diagnosis: DX CERVICAL DISC HERNIATION C5-6    Post-Op Diagnosis: Same       SURGEON:  Jacob Hernandez MD    ANESTHESIA:  General endotracheal anesthesia. PROCEDURE:  1. C5-C6 anterior cervical diskectomy and fusion. 2. Insertion of interbody cage for spinal fusion at C5-C6. 3. Insertion of anterior cervical plate and screws at X6-M6      utilizing Medtronic Zevo plate and screws. 4. [place] of local bone for spinal fusion. 5. Use of allograft bone for spinal fusion to include Osteocel  6. Intraoperative use of C-arm fluoroscopy. ESTIMATED BLOOD LOSS:  25 mL. FLUIDS:  Per anesthesia record. COMPLICATIONS:  None. INDICATIONS:  This is a pleasant 61-year-old male with a  longstanding history of significant neck pain radiating to his  left upper extremity. He had done extensive conservative  management to include physical therapy, medications, and pain  management all with minimal long-standing benefit. Due to his  continued symptoms and failure of conservative management, it was  discussed with him the option of performing a C5-C6 anterior  cervical diskectomy and fusion. After MRI did show significant stenosis   to the right consistent with his symptoms at C5-  C6. Risks and benefits were discussed including bleeding, infection,  injury to nerves, vessels, anesthetic risk, need for possible  further future surgery as well as the possibility for continued  pain, continued symptoms, possible nonunion. He did understand  all these risks, did wish to proceed. Informed consent was  obtained. PROCEDURE:  Patient was taken to the operating room and placed  supine on the operating table. He was intubated and placed  under general anesthesia by anesthesiologist.  He was given  preoperative antibiotic prophylaxis.   A shoulder bump was placed,  arms were tucked at his sides. All bony prominences were padded. The neck was slightly extended and the neck was then prepped and  draped in a sterile fashion. Marking pen was used to kami out  the left C5-C6 region and approximately a 3-4 cm incision was  made over the left side of the neck over the C5-C6 level. The  dissection was then carried down through the subcutaneous  tissues. Platysma was incised in line with the skin incision. Metzenbaum scissors were then used to carefully dissect both  proximally and distally and undermining the platysma. The deep  cervical fascia was then carefully opened medial to the  sternocleidomastoid. Blunt finger dissection was then used to  proceed medial to the sternocleidomastoid and carotid sheath and  lateral to the esophagus and trachea, down to the anterior  cervical spine. Handheld Cloward was then used to expose the  prevertebral fascia and Kitners were used to bluntly dissect this  free to expose the anterior longitudinal ligament. At this  point, a bent needle was then placed at the presumed C5-C6 level. C-arm was then brought in, in lateral view and confirmed it to be  at the appropriate level. At this point, the disk space was  marked with the Bovie. Bovie was then used to dissect the longus  coli free bilaterally and the Trimline retractor was inserted. Anterior osteophytes were then removed with a Leksell and this  bone was harvested for later use. The long handle knife was then  used to incise the disc. Partial diskectomy was then performed. Distractor pins were then placed at the C5-C6 level and  distraction was then performed. A curette was used to remove the  cartilaginous endplates as well as the remainder of the disk  material back to the posterior longitudinal ligament at the C5-C6  level. Once this was done, gracie was used to bur the posterior  osteophytes. The curette was used to again remove the posterior  osteophytes as well as the free up the foramen.  There was a large  disk herniation present on the left side at the C5-C6 level and  this was removed with pituitary. Kerrison was used to perform foraminotomies and full      decompression was complete. Nerve hook was used to verify      that the foramen were completely free which they were. At      this point, the endplates were prepped. Sizing was then      performed showed that 8mm cage would      be appropriate. This was packed with the local bone, which      was previously harvested as well as the Osteocell      allograft bone. The cage was then packed into the disk      space until it was fully seated.  was then removed. The distractor pins were then removed and the head was then      flexed up slightly to allow compression at the interspace at      C5-C6. The  anterior plate was then chosen, which was      chosen to be 19 mm plate. Drill was used to have for starting      holes and screws were then placed, 15mm screws. Until      the screws were fully seated and locking mechanism was then      engaged. Wound was then irrigated with sterile normal      saline. The Trimline retractor was then removed. C-arm was      brought back in and confirmed the instrumentation was in      excellent position at the C5-C6 level. At this point, the      wound was then reinspected utilizing hand-held Cloward      confirmed that there was no significant bleeding present. The wound was once again irrigated. The closure was then      performed with a 3-0 Vicryl followed by a running 4-0      Monocryl suture. Dermabond glue was applied and standard      dressing, standard collar was then applied. He was then      awoken from anesthesia, extubated, and taken to recovery      room in stable condition.     Electronically signed by Bj Vargas MD on 2/22/2021 at 8:29 PM

## 2021-02-23 NOTE — PROGRESS NOTES
Patient reviewed discharge instructions and questions answered. Home with family at discharge. Transported to lobby with all belongings as documented. Discharged from hospital in stable condition.

## 2021-02-23 NOTE — PROGRESS NOTES
[facility] Ortho Spine  Attending Progress Note  2/23/2021  7:48 AM     Destiny Lopez    1970   [id number]      SUBJECTIVE:  Doing well. Pain controlled. Denies arm symptoms. Has been up walking and voiding. No CP/SOB    OBJECTIVE      Physical      VITALS:  /63   Pulse 75   Temp 97.7 °F (36.5 °C) (Oral)   Resp 16   Ht 6' 1\" (1.854 m)   Wt 255 lb (115.7 kg)   SpO2 98%   BMI 33.64 kg/m²     Dressing C/D/I    NEUROLOGIC: Alert and Oriented x 3. Strength 5/5 HF, 5/5 Q, 5/5 TA, 5/5 EHL, 5/5 GS. 5/5 D, 5/5 B, 5/5 T, 5/5 WE, 5/5 WF, 5/5 I                                                                  Sensation intact.      Data  CBC:   Lab Results   Component Value Date    WBC 21.2 02/23/2021    RBC 4.94 02/23/2021    HGB 14.2 02/23/2021    HCT 45.9 02/23/2021    MCV 92.9 02/23/2021    MCH 28.7 02/23/2021    MCHC 30.9 02/23/2021    RDW 13.4 02/23/2021     02/23/2021    MPV 11.2 02/23/2021     BMP:    Lab Results   Component Value Date     02/23/2021    K 4.9 02/23/2021    CL 99 02/23/2021    CO2 26 02/23/2021    BUN 19 02/23/2021    CREATININE 1.03 02/23/2021    CALCIUM 9.3 02/23/2021    GFRAA >60 02/23/2021    LABGLOM >60 02/23/2021    GLUCOSE 264 02/23/2021           Current Inpatient Medications    Current Facility-Administered Medications: lisinopril (PRINIVIL;ZESTRIL) tablet 40 mg, 40 mg, Oral, Daily  metoprolol tartrate (LOPRESSOR) tablet 50 mg, 50 mg, Oral, BID  pantoprazole (PROTONIX) tablet 40 mg, 40 mg, Oral, QAM AC  pregabalin (LYRICA) capsule 50 mg, 50 mg, Oral, BID  glucose (GLUTOSE) 40 % oral gel 15 g, 15 g, Oral, PRN  dextrose 50 % IV solution, 12.5 g, Intravenous, PRN  glucagon (rDNA) injection 1 mg, 1 mg, Intramuscular, PRN  dextrose 5 % solution, 100 mL/hr, Intravenous, PRN  0.9 % sodium chloride infusion, , Intravenous, Continuous  sodium chloride flush 0.9 % injection 10 mL, 10 mL, Intravenous, 2 times per day  sodium chloride flush 0.9 % injection 10 mL, 10 mL, Intravenous, PRN  morphine (PF) injection 2 mg, 2 mg, Intravenous, Q2H PRN  polyethylene glycol (GLYCOLAX) packet 17 g, 17 g, Oral, Daily  sennosides-docusate sodium (SENOKOT-S) 8.6-50 MG tablet 1 tablet, 1 tablet, Oral, BID  promethazine (PHENERGAN) tablet 12.5 mg, 12.5 mg, Oral, Q6H PRN **OR** ondansetron (ZOFRAN) injection 4 mg, 4 mg, Intravenous, Q6H PRN  insulin lispro (HUMALOG) injection vial 0-12 Units, 0-12 Units, Subcutaneous, TID WC  insulin lispro (HUMALOG) injection vial 0-6 Units, 0-6 Units, Subcutaneous, Nightly  oxyCODONE-acetaminophen (PERCOCET) 5-325 MG per tablet 1 tablet, 1 tablet, Oral, Q4H PRN **OR** oxyCODONE-acetaminophen (PERCOCET) 5-325 MG per tablet 2 tablet, 2 tablet, Oral, Q4H PRN  tiZANidine (ZANAFLEX) tablet 4 mg, 4 mg, Oral, Q8H PRN    ASSESSMENT AND PLAN    53 y.o. male status post C5-6 ACDF post op day #  1    1. PT- WBAT  2. Pain control  3. EPC  4.  D/C plan for home today    Joby Stephens MD  [facility]  Spine Surgeon  [phone]

## 2021-02-23 NOTE — PLAN OF CARE
Problem: Infection:  Goal: Will remain free from infection  Outcome: Ongoing     Problem: Daily Care:  Goal: Daily care needs are met  Outcome: Ongoing     Problem: Pain:  Goal: Patient's pain/discomfort is manageable  2/23/2021 0139 by Yuly Mead RN  Outcome: Ongoing     Problem: Pain:  Goal: Pain level will decrease  Outcome: Ongoing     Problem: Pain:  Goal: Control of acute pain  Outcome: Ongoing     Problem: Skin Integrity:  Goal: Skin integrity will stabilize  Outcome: Ongoing     Problem: Discharge Planning:  Goal: Patients continuum of care needs are met  Outcome: Ongoing

## 2022-11-01 ENCOUNTER — TELEPHONE (OUTPATIENT)
Dept: PODIATRY | Age: 52
End: 2022-11-01

## 2022-11-09 ENCOUNTER — OFFICE VISIT (OUTPATIENT)
Dept: PODIATRY | Age: 52
End: 2022-11-09
Payer: COMMERCIAL

## 2022-11-09 VITALS
HEIGHT: 73 IN | SYSTOLIC BLOOD PRESSURE: 131 MMHG | BODY MASS INDEX: 34.46 KG/M2 | HEART RATE: 77 BPM | WEIGHT: 260 LBS | DIASTOLIC BLOOD PRESSURE: 80 MMHG

## 2022-11-09 DIAGNOSIS — Z79.4 CONTROLLED TYPE 2 DIABETES MELLITUS WITH DIABETIC POLYNEUROPATHY, WITH LONG-TERM CURRENT USE OF INSULIN (HCC): ICD-10-CM

## 2022-11-09 DIAGNOSIS — B35.1 ONYCHOMYCOSIS: ICD-10-CM

## 2022-11-09 DIAGNOSIS — E11.42 CONTROLLED TYPE 2 DIABETES MELLITUS WITH DIABETIC POLYNEUROPATHY, WITH LONG-TERM CURRENT USE OF INSULIN (HCC): ICD-10-CM

## 2022-11-09 DIAGNOSIS — E08.621 DIABETIC ULCER OF TOE OF RIGHT FOOT ASSOCIATED WITH DIABETES MELLITUS DUE TO UNDERLYING CONDITION, WITH NECROSIS OF MUSCLE (HCC): Primary | ICD-10-CM

## 2022-11-09 DIAGNOSIS — L97.513 DIABETIC ULCER OF TOE OF RIGHT FOOT ASSOCIATED WITH DIABETES MELLITUS DUE TO UNDERLYING CONDITION, WITH NECROSIS OF MUSCLE (HCC): Primary | ICD-10-CM

## 2022-11-09 PROCEDURE — 11043 DBRDMT MUSC&/FSCA 1ST 20/<: CPT | Performed by: PODIATRIST

## 2022-11-09 PROCEDURE — 11056 PARNG/CUTG B9 HYPRKR LES 2-4: CPT

## 2022-11-09 PROCEDURE — 99203 OFFICE O/P NEW LOW 30 MIN: CPT

## 2022-11-09 PROCEDURE — 3074F SYST BP LT 130 MM HG: CPT

## 2022-11-09 PROCEDURE — 3078F DIAST BP <80 MM HG: CPT

## 2022-11-09 PROCEDURE — 11721 DEBRIDE NAIL 6 OR MORE: CPT

## 2022-11-09 PROCEDURE — 11721 DEBRIDE NAIL 6 OR MORE: CPT | Performed by: PODIATRIST

## 2022-11-09 RX ORDER — UREA 40 %
CREAM (GRAM) TOPICAL
Qty: 227 G | Refills: 5 | Status: SHIPPED | OUTPATIENT
Start: 2022-11-09

## 2022-11-09 NOTE — PROGRESS NOTES
[address],  [place]  Tel: [phone]   Fax: [phone]    Subjective     CC: Diabetic foot exam and painful and elongated toe nails    HPI:  Archie Sprague is a 47y.o. year old male who presents to clinic today for high risk diabetic foot examination and also complaining of painful and elongated toenails. The patient is a diabetic. The patient states their digits are painful when the toenails are elongated, causing rubbing in shoe gear. He also has a diabetic foot ulcer to the right great toe. Pt states he was seeing a podiatrist and was referred to us. The patient atates he has burning, tingling and numbness in the lower extremities. Patient also states he notices cold feet at times. The patient denies any other pedal complaints. Primary care physician is Yessica Reddy MD.    ROS:    Constitutional: Denies nausea, vomiting, fever, chills. Neurologic: positive numbness, tingling, and burning in the feet. Vascular: Denies symptoms of lower extremity claudication. Skin: Positive open wounds. Otherwise negative except as noted in the HPI.      PMH:  Past Medical History:   Diagnosis Date    Diabetes mellitus ([address]     GERD (gastroesophageal reflux disease)     Hypertension        Surgical History:   Past Surgical History:   Procedure Laterality Date    ARM SURGERY Left     ulnar nerve release    CARDIAC SURGERY      cardiac catheterization    CERVICAL FUSION N/A 2/22/2021    C 5-6 CERVICAL DISCECTOMY FUSION ANTERIOR - [facility] performed by Rashel Villarreal MD at [address] [place] [address] ARTHROSCOPY Bilateral     SHOULDER ARTHROPLASTY Bilateral     THROAT SURGERY  2020       Social History:  Social History     Tobacco Use    Smoking status: Every Day     Types: Cigarettes    Smokeless tobacco: Never   Vaping Use    Vaping Use: Never used   Substance Use Topics    Alcohol use: Yes     Comment: occcasionally     Drug use: Never       Medications:  Prior to Admission medications    Medication Sig Start Date End Date Taking? Authorizing Provider   Urea (CARMOL) 40 % cream Please apply to callus every day to every other day and use a pumice stone after once a day 11/9/22  Yes Gibran Renteria DPM   sennosides-docusate sodium (SENOKOT-S) 8.6-50 MG tablet Take 1 tablet by mouth 2 times daily 2/23/21  Yes Jurgen Healy MD   tiZANidine (ZANAFLEX) 4 MG tablet Take 1 tablet by mouth every 8 hours as needed (spasm) 2/23/21  Yes Jurgen Healy MD   omeprazole (PRILOSEC) 20 MG delayed release capsule Take 20 mg by mouth daily   Yes Historical Provider, MD   pregabalin (LYRICA) 50 MG capsule Take 50 mg by mouth 2 times daily. Yes Historical Provider, MD   dapagliflozin (FARXIGA) 10 MG tablet Take 10 mg by mouth every morning   Yes Historical Provider, MD   metFORMIN (GLUCOPHAGE) 500 MG tablet Take 500 mg by mouth 2 times daily (with meals)   Yes Historical Provider, MD   lisinopril (PRINIVIL;ZESTRIL) 40 MG tablet Take 40 mg by mouth daily   Yes Historical Provider, MD   Insulin Aspart (NOVOLOG SC) Inject into the skin   Yes Historical Provider, MD   metoprolol tartrate (LOPRESSOR) 50 MG tablet Take 50 mg by mouth 2 times daily   Yes Historical Provider, MD       Objective     Vitals:    11/09/22 1253   BP: 131/80   Pulse: 77       Lab Results   Component Value Date    LABA1C 7.2 (H) 02/22/2021       Physical Exam:  General:  Alert and oriented x3. In no acute distress. Physical Exam:    Vascular: DP and PT pulses are intact palpable, Bilateral. CFT <4 seconds to all digits, Bilateral.  No edema, Bilateral.  Hair growth is absent to the level of the digits, Bilateral.     Neuro: Saph/sural/SP/DP/plantar sensation diminished to light touch. Protective sensation is intact to 6/10 sites as tested with a 5.07g SWMF, Bilateral.     Musculoskeletal: EHL/FHL/GS/TA gross motor intact. TTP to distal digits b/l.   Gross deformity is absent, Bilateral.     Dermatologic: Open lesions noted to the distal plantar aspect of the right great toe the ulcer measure 0.3cmx 0.3cm x level of dermis. Interdigital maceration absent, Bilateral.  Nails 1-10 are thickened with subungual debris noted, in addition to being elongated and dystrophic. HPK noted to the sub 5th metatarsal head bilaterally, right heel and sub 1st metatarsal; right foot      Class A Findings (Q7) - One Class A finding  [] Non traumatic amputation of foot or integral skeletal portion thereof  Class B Findings (Q8) - Two Class B findings  []Absent posterior tibial pulse   []Absent dorsalis pedis pulse   [x]Advanced trophic changes; three of the following are required:   [x]hair growth (decrease or absence)   [x]nail changes (thickening)   [x]pigmentary changes (discoloration)   []skin texture (thin, shiny)   []skin color (rubor or redness)  Class C Findings (Q9) - One Class B and two Class C findings  []Claudication   [x]Temperature changes   []Edema   []Paresthesia   [x]Burning    Q Modifier Met: Q9: One Class B and two Class C findings        Assessment   Bernadine Casillas is a 47 y.o. male with     Diagnosis Orders   1. Diabetic ulcer of toe of right foot associated with diabetes mellitus due to underlying condition, with necrosis of muscle (HCC)  XR FOOT RIGHT (MIN 3 VIEWS)      2. Controlled type 2 diabetes mellitus with diabetic polyneuropathy, with long-term current use of insulin (HCC)  XR FOOT RIGHT (MIN 3 VIEWS)      3. Onychomycosis             Plan   Patient examined and evaluated  The patient was educated on clinical, diagnosis and treatment plans. Patient states that he understands all that has been explained and all questions were answered to his apparent satisfaction.   The patient presented to our clinic today for diabetic nail care or diabetic ulcer to the right great toe  We extensively discussed with the patient regarding the complex nature of diabetes mellitus along with the numerous comorbidities that may be subsequently developed. Furthermore we discussed regular foot care, the critical importance of glycemic control, peripheral neuropathy, signs of infection, peripheral arterial disease. Encouraged the patient to continue daily foot checks and avoid ambulating barefoot. There is low risk of morbidity from additional diagnostic testing or treatment. At this time we recommend moving forward with getting xrays to evaluate the the phalanx for spurs. We also discussed using urea cream to the HPK of bilateral feet and use of pumice stone. Nails 1-10 sharply debrided of all non-viable tissue and debris with sterile nail nippers without incident. HPK x4 debrided with #15 blade without incident. Patient tolerated well. Pt advised to continue wound care   Patient to RTC in 2 weeks . Please, call the office with any questions or concerns     Orders Placed This Encounter   Medications    Urea (CARMOL) 40 % cream     Sig: Please apply to callus every day to every other day and use a pumice stone after once a day     Dispense:  227 g     Refill:  5     Orders Placed This Encounter   Procedures    XR FOOT RIGHT (MIN 3 VIEWS)     Standing Status:   Future     Standing Expiration Date:   11/9/2023     Scheduling Instructions:      Please perform weight bearing Xrays            Thank you       Zeinab Dos Santos DPM   Podiatric Medicine & Surgery   11/10/2022 at 1:13 PM    I performed a history and physical examination of the patient and discussed management with the resident. I reviewed the residents note and agree with the documented findings and plan of care. Any areas of disagreement are noted on the chart. I was personally present for the key portions of any procedures. I have documented in the chart those procedures where I was not present during the key portions. I have reviewed the Podiatry Resident progress note.  I agree with the chief complaint, past medical history, past surgical history, allergies, medications, social

## 2024-02-22 ENCOUNTER — APPOINTMENT (OUTPATIENT)
Dept: GENERAL RADIOLOGY | Age: 54
DRG: 629 | End: 2024-02-22
Payer: COMMERCIAL

## 2024-02-22 ENCOUNTER — HOSPITAL ENCOUNTER (INPATIENT)
Age: 54
LOS: 6 days | Discharge: HOME | DRG: 629 | End: 2024-02-29
Attending: EMERGENCY MEDICINE | Admitting: INTERNAL MEDICINE
Payer: COMMERCIAL

## 2024-02-22 DIAGNOSIS — L97.516 DIABETIC ULCER OF TOE OF RIGHT FOOT ASSOCIATED WITH DIABETES MELLITUS DUE TO UNDERLYING CONDITION, WITH BONE INVOLVEMENT WITHOUT EVIDENCE OF NECROSIS (HCC): ICD-10-CM

## 2024-02-22 DIAGNOSIS — E08.621 DIABETIC ULCER OF TOE OF RIGHT FOOT ASSOCIATED WITH DIABETES MELLITUS DUE TO UNDERLYING CONDITION, WITH BONE INVOLVEMENT WITHOUT EVIDENCE OF NECROSIS (HCC): ICD-10-CM

## 2024-02-22 DIAGNOSIS — M72.6 NECROTIZING FASCIITIS (HCC): Primary | ICD-10-CM

## 2024-02-22 DIAGNOSIS — E10.621 DIABETIC ULCER OF RIGHT FOOT ASSOCIATED WITH TYPE 1 DIABETES MELLITUS, UNSPECIFIED PART OF FOOT, UNSPECIFIED ULCER STAGE (HCC): ICD-10-CM

## 2024-02-22 DIAGNOSIS — L97.519 DIABETIC ULCER OF RIGHT FOOT ASSOCIATED WITH TYPE 1 DIABETES MELLITUS, UNSPECIFIED PART OF FOOT, UNSPECIFIED ULCER STAGE (HCC): ICD-10-CM

## 2024-02-22 PROCEDURE — 73630 X-RAY EXAM OF FOOT: CPT

## 2024-02-22 PROCEDURE — 85652 RBC SED RATE AUTOMATED: CPT

## 2024-02-22 PROCEDURE — 84145 PROCALCITONIN (PCT): CPT

## 2024-02-22 PROCEDURE — 96365 THER/PROPH/DIAG IV INF INIT: CPT

## 2024-02-22 PROCEDURE — 86140 C-REACTIVE PROTEIN: CPT

## 2024-02-22 PROCEDURE — 99285 EMERGENCY DEPT VISIT HI MDM: CPT

## 2024-02-22 RX ORDER — METRONIDAZOLE 500 MG/1
500 TABLET ORAL ONCE
Status: COMPLETED | OUTPATIENT
Start: 2024-02-22 | End: 2024-02-23

## 2024-02-22 ASSESSMENT — PAIN SCALES - GENERAL: PAINLEVEL_OUTOF10: 3

## 2024-02-22 ASSESSMENT — PAIN - FUNCTIONAL ASSESSMENT: PAIN_FUNCTIONAL_ASSESSMENT: 0-10

## 2024-02-23 ENCOUNTER — APPOINTMENT (OUTPATIENT)
Dept: CT IMAGING | Age: 54
DRG: 629 | End: 2024-02-23
Payer: COMMERCIAL

## 2024-02-23 PROBLEM — M72.6 NECROTIZING FASCIITIS (HCC): Status: ACTIVE | Noted: 2024-02-23

## 2024-02-23 LAB
ANION GAP SERPL CALCULATED.3IONS-SCNC: 10 MMOL/L (ref 9–17)
ANION GAP SERPL CALCULATED.3IONS-SCNC: 7 MMOL/L (ref 9–17)
BASOPHILS # BLD: 0.07 K/UL (ref 0–0.2)
BASOPHILS # BLD: 0.1 K/UL (ref 0–0.2)
BASOPHILS NFR BLD: 1 % (ref 0–2)
BASOPHILS NFR BLD: 1 % (ref 0–2)
BUN SERPL-MCNC: 12 MG/DL (ref 6–20)
BUN SERPL-MCNC: 13 MG/DL (ref 6–20)
CALCIUM SERPL-MCNC: 8.7 MG/DL (ref 8.6–10.4)
CALCIUM SERPL-MCNC: 9.1 MG/DL (ref 8.6–10.4)
CHLORIDE SERPL-SCNC: 105 MMOL/L (ref 98–107)
CHLORIDE SERPL-SCNC: 106 MMOL/L (ref 98–107)
CO2 SERPL-SCNC: 20 MMOL/L (ref 20–31)
CO2 SERPL-SCNC: 23 MMOL/L (ref 20–31)
CREAT SERPL-MCNC: 0.8 MG/DL (ref 0.7–1.2)
CREAT SERPL-MCNC: 0.8 MG/DL (ref 0.7–1.2)
CRP SERPL HS-MCNC: 119.8 MG/L (ref 0–5)
CRP SERPL HS-MCNC: 129.4 MG/L (ref 0–5)
EOSINOPHIL # BLD: 0.24 K/UL (ref 0–0.44)
EOSINOPHIL # BLD: 0.29 K/UL (ref 0–0.44)
EOSINOPHILS RELATIVE PERCENT: 2 % (ref 1–4)
EOSINOPHILS RELATIVE PERCENT: 2 % (ref 1–4)
ERYTHROCYTE [DISTWIDTH] IN BLOOD BY AUTOMATED COUNT: 13.1 % (ref 11.8–14.4)
ERYTHROCYTE [DISTWIDTH] IN BLOOD BY AUTOMATED COUNT: 13.1 % (ref 11.8–14.4)
ERYTHROCYTE [SEDIMENTATION RATE] IN BLOOD BY PHOTOMETRIC METHOD: 43 MM/HR (ref 0–20)
ERYTHROCYTE [SEDIMENTATION RATE] IN BLOOD BY PHOTOMETRIC METHOD: 43 MM/HR (ref 0–20)
GFR SERPL CREATININE-BSD FRML MDRD: >60 ML/MIN/1.73M2
GFR SERPL CREATININE-BSD FRML MDRD: >60 ML/MIN/1.73M2
GLUCOSE BLD-MCNC: 148 MG/DL (ref 75–110)
GLUCOSE SERPL-MCNC: 143 MG/DL (ref 70–99)
GLUCOSE SERPL-MCNC: 170 MG/DL (ref 70–99)
HCT VFR BLD AUTO: 38.6 % (ref 40.7–50.3)
HCT VFR BLD AUTO: 42.9 % (ref 40.7–50.3)
HGB BLD-MCNC: 12 G/DL (ref 13–17)
HGB BLD-MCNC: 13.4 G/DL (ref 13–17)
IMM GRANULOCYTES # BLD AUTO: 0.03 K/UL (ref 0–0.3)
IMM GRANULOCYTES # BLD AUTO: 0.05 K/UL (ref 0–0.3)
IMM GRANULOCYTES NFR BLD: 0 %
IMM GRANULOCYTES NFR BLD: 0 %
INR PPP: 1.1
LYMPHOCYTES NFR BLD: 1.56 K/UL (ref 1.1–3.7)
LYMPHOCYTES NFR BLD: 2.42 K/UL (ref 1.1–3.7)
LYMPHOCYTES RELATIVE PERCENT: 13 % (ref 24–43)
LYMPHOCYTES RELATIVE PERCENT: 18 % (ref 24–43)
MCH RBC QN AUTO: 27.9 PG (ref 25.2–33.5)
MCH RBC QN AUTO: 28.2 PG (ref 25.2–33.5)
MCHC RBC AUTO-ENTMCNC: 31.1 G/DL (ref 28.4–34.8)
MCHC RBC AUTO-ENTMCNC: 31.2 G/DL (ref 28.4–34.8)
MCV RBC AUTO: 89.8 FL (ref 82.6–102.9)
MCV RBC AUTO: 90.3 FL (ref 82.6–102.9)
MONOCYTES NFR BLD: 0.94 K/UL (ref 0.1–1.2)
MONOCYTES NFR BLD: 1.18 K/UL (ref 0.1–1.2)
MONOCYTES NFR BLD: 8 % (ref 3–12)
MONOCYTES NFR BLD: 9 % (ref 3–12)
NEUTROPHILS NFR BLD: 70 % (ref 36–65)
NEUTROPHILS NFR BLD: 76 % (ref 36–65)
NEUTS SEG NFR BLD: 8.74 K/UL (ref 1.5–8.1)
NEUTS SEG NFR BLD: 9.24 K/UL (ref 1.5–8.1)
NRBC BLD-RTO: 0 PER 100 WBC
NRBC BLD-RTO: 0 PER 100 WBC
PLATELET # BLD AUTO: 186 K/UL (ref 138–453)
PLATELET # BLD AUTO: 207 K/UL (ref 138–453)
PMV BLD AUTO: 11.5 FL (ref 8.1–13.5)
PMV BLD AUTO: 11.6 FL (ref 8.1–13.5)
POTASSIUM SERPL-SCNC: 3.5 MMOL/L (ref 3.7–5.3)
POTASSIUM SERPL-SCNC: 3.9 MMOL/L (ref 3.7–5.3)
PROCALCITONIN SERPL-MCNC: 0.23 NG/ML
PROTHROMBIN TIME: 14.3 SEC (ref 11.7–14.9)
RBC # BLD AUTO: 4.3 M/UL (ref 4.21–5.77)
RBC # BLD AUTO: 4.75 M/UL (ref 4.21–5.77)
SODIUM SERPL-SCNC: 135 MMOL/L (ref 135–144)
SODIUM SERPL-SCNC: 136 MMOL/L (ref 135–144)
WBC OTHER # BLD: 11.6 K/UL (ref 3.5–11.3)
WBC OTHER # BLD: 13.3 K/UL (ref 3.5–11.3)

## 2024-02-23 PROCEDURE — 97530 THERAPEUTIC ACTIVITIES: CPT

## 2024-02-23 PROCEDURE — 73701 CT LOWER EXTREMITY W/DYE: CPT

## 2024-02-23 PROCEDURE — 6360000002 HC RX W HCPCS: Performed by: NURSE PRACTITIONER

## 2024-02-23 PROCEDURE — 87186 SC STD MICRODIL/AGAR DIL: CPT

## 2024-02-23 PROCEDURE — 2580000003 HC RX 258: Performed by: NURSE PRACTITIONER

## 2024-02-23 PROCEDURE — 2580000003 HC RX 258: Performed by: STUDENT IN AN ORGANIZED HEALTH CARE EDUCATION/TRAINING PROGRAM

## 2024-02-23 PROCEDURE — 85610 PROTHROMBIN TIME: CPT

## 2024-02-23 PROCEDURE — 87075 CULTR BACTERIA EXCEPT BLOOD: CPT

## 2024-02-23 PROCEDURE — 87205 SMEAR GRAM STAIN: CPT

## 2024-02-23 PROCEDURE — 6370000000 HC RX 637 (ALT 250 FOR IP): Performed by: STUDENT IN AN ORGANIZED HEALTH CARE EDUCATION/TRAINING PROGRAM

## 2024-02-23 PROCEDURE — 82947 ASSAY GLUCOSE BLOOD QUANT: CPT

## 2024-02-23 PROCEDURE — 85025 COMPLETE CBC W/AUTO DIFF WBC: CPT

## 2024-02-23 PROCEDURE — 6360000002 HC RX W HCPCS: Performed by: INTERNAL MEDICINE

## 2024-02-23 PROCEDURE — 99222 1ST HOSP IP/OBS MODERATE 55: CPT | Performed by: STUDENT IN AN ORGANIZED HEALTH CARE EDUCATION/TRAINING PROGRAM

## 2024-02-23 PROCEDURE — 87077 CULTURE AEROBIC IDENTIFY: CPT

## 2024-02-23 PROCEDURE — 6370000000 HC RX 637 (ALT 250 FOR IP): Performed by: NURSE PRACTITIONER

## 2024-02-23 PROCEDURE — 6360000004 HC RX CONTRAST MEDICATION: Performed by: STUDENT IN AN ORGANIZED HEALTH CARE EDUCATION/TRAINING PROGRAM

## 2024-02-23 PROCEDURE — 86140 C-REACTIVE PROTEIN: CPT

## 2024-02-23 PROCEDURE — 36415 COLL VENOUS BLD VENIPUNCTURE: CPT

## 2024-02-23 PROCEDURE — 6360000002 HC RX W HCPCS: Performed by: STUDENT IN AN ORGANIZED HEALTH CARE EDUCATION/TRAINING PROGRAM

## 2024-02-23 PROCEDURE — 1200000000 HC SEMI PRIVATE

## 2024-02-23 PROCEDURE — 87070 CULTURE OTHR SPECIMN AEROBIC: CPT

## 2024-02-23 PROCEDURE — 2580000003 HC RX 258: Performed by: INTERNAL MEDICINE

## 2024-02-23 PROCEDURE — 97161 PT EVAL LOW COMPLEX 20 MIN: CPT

## 2024-02-23 PROCEDURE — 80048 BASIC METABOLIC PNL TOTAL CA: CPT

## 2024-02-23 PROCEDURE — 85652 RBC SED RATE AUTOMATED: CPT

## 2024-02-23 PROCEDURE — 99222 1ST HOSP IP/OBS MODERATE 55: CPT | Performed by: PODIATRIST

## 2024-02-23 RX ORDER — ACETAMINOPHEN 650 MG/1
650 SUPPOSITORY RECTAL EVERY 6 HOURS PRN
Status: DISCONTINUED | OUTPATIENT
Start: 2024-02-23 | End: 2024-02-29 | Stop reason: HOSPADM

## 2024-02-23 RX ORDER — ENOXAPARIN SODIUM 100 MG/ML
30 INJECTION SUBCUTANEOUS 2 TIMES DAILY
Status: DISCONTINUED | OUTPATIENT
Start: 2024-02-23 | End: 2024-02-29 | Stop reason: HOSPADM

## 2024-02-23 RX ORDER — ONDANSETRON 4 MG/1
4 TABLET, ORALLY DISINTEGRATING ORAL EVERY 8 HOURS PRN
Status: DISCONTINUED | OUTPATIENT
Start: 2024-02-23 | End: 2024-02-29 | Stop reason: HOSPADM

## 2024-02-23 RX ORDER — INSULIN LISPRO 100 [IU]/ML
0-4 INJECTION, SOLUTION INTRAVENOUS; SUBCUTANEOUS
Status: DISCONTINUED | OUTPATIENT
Start: 2024-02-23 | End: 2024-02-29 | Stop reason: HOSPADM

## 2024-02-23 RX ORDER — GABAPENTIN 600 MG/1
600 TABLET ORAL 3 TIMES DAILY
Status: DISCONTINUED | OUTPATIENT
Start: 2024-02-23 | End: 2024-02-29 | Stop reason: HOSPADM

## 2024-02-23 RX ORDER — SODIUM CHLORIDE 0.9 % (FLUSH) 0.9 %
10 SYRINGE (ML) INJECTION PRN
Status: DISCONTINUED | OUTPATIENT
Start: 2024-02-23 | End: 2024-02-29 | Stop reason: HOSPADM

## 2024-02-23 RX ORDER — ACETAMINOPHEN 325 MG/1
650 TABLET ORAL EVERY 6 HOURS PRN
Status: DISCONTINUED | OUTPATIENT
Start: 2024-02-23 | End: 2024-02-29 | Stop reason: HOSPADM

## 2024-02-23 RX ORDER — SODIUM CHLORIDE 9 MG/ML
INJECTION, SOLUTION INTRAVENOUS PRN
Status: DISCONTINUED | OUTPATIENT
Start: 2024-02-23 | End: 2024-02-29 | Stop reason: HOSPADM

## 2024-02-23 RX ORDER — LISINOPRIL 20 MG/1
40 TABLET ORAL DAILY
Status: DISCONTINUED | OUTPATIENT
Start: 2024-02-23 | End: 2024-02-29 | Stop reason: HOSPADM

## 2024-02-23 RX ORDER — POTASSIUM CHLORIDE 7.45 MG/ML
10 INJECTION INTRAVENOUS PRN
Status: DISCONTINUED | OUTPATIENT
Start: 2024-02-23 | End: 2024-02-29 | Stop reason: HOSPADM

## 2024-02-23 RX ORDER — MAGNESIUM SULFATE 1 G/100ML
1000 INJECTION INTRAVENOUS PRN
Status: DISCONTINUED | OUTPATIENT
Start: 2024-02-23 | End: 2024-02-29 | Stop reason: HOSPADM

## 2024-02-23 RX ORDER — PANTOPRAZOLE SODIUM 40 MG/1
40 TABLET, DELAYED RELEASE ORAL
Status: DISCONTINUED | OUTPATIENT
Start: 2024-02-24 | End: 2024-02-29 | Stop reason: HOSPADM

## 2024-02-23 RX ORDER — INSULIN DEGLUDEC 100 U/ML
45 INJECTION, SOLUTION SUBCUTANEOUS DAILY
COMMUNITY

## 2024-02-23 RX ORDER — POTASSIUM CHLORIDE 20 MEQ/1
40 TABLET, EXTENDED RELEASE ORAL PRN
Status: DISCONTINUED | OUTPATIENT
Start: 2024-02-23 | End: 2024-02-29 | Stop reason: HOSPADM

## 2024-02-23 RX ORDER — GABAPENTIN 600 MG/1
600 TABLET ORAL 3 TIMES DAILY
COMMUNITY

## 2024-02-23 RX ORDER — ONDANSETRON 2 MG/ML
4 INJECTION INTRAMUSCULAR; INTRAVENOUS EVERY 6 HOURS PRN
Status: DISCONTINUED | OUTPATIENT
Start: 2024-02-23 | End: 2024-02-29 | Stop reason: HOSPADM

## 2024-02-23 RX ORDER — SODIUM CHLORIDE 0.9 % (FLUSH) 0.9 %
5-40 SYRINGE (ML) INJECTION EVERY 12 HOURS SCHEDULED
Status: DISCONTINUED | OUTPATIENT
Start: 2024-02-23 | End: 2024-02-29 | Stop reason: HOSPADM

## 2024-02-23 RX ORDER — METOPROLOL TARTRATE 50 MG/1
50 TABLET, FILM COATED ORAL 2 TIMES DAILY
Status: DISCONTINUED | OUTPATIENT
Start: 2024-02-23 | End: 2024-02-29 | Stop reason: HOSPADM

## 2024-02-23 RX ORDER — OXYCODONE HYDROCHLORIDE 5 MG/1
5 TABLET ORAL EVERY 4 HOURS PRN
Status: DISCONTINUED | OUTPATIENT
Start: 2024-02-23 | End: 2024-02-29 | Stop reason: HOSPADM

## 2024-02-23 RX ORDER — POLYETHYLENE GLYCOL 3350 17 G/17G
17 POWDER, FOR SOLUTION ORAL DAILY PRN
Status: DISCONTINUED | OUTPATIENT
Start: 2024-02-23 | End: 2024-02-29 | Stop reason: HOSPADM

## 2024-02-23 RX ORDER — INSULIN LISPRO 100 [IU]/ML
0-4 INJECTION, SOLUTION INTRAVENOUS; SUBCUTANEOUS NIGHTLY
Status: DISCONTINUED | OUTPATIENT
Start: 2024-02-23 | End: 2024-02-29 | Stop reason: HOSPADM

## 2024-02-23 RX ADMIN — METRONIDAZOLE 500 MG: 500 TABLET ORAL at 00:21

## 2024-02-23 RX ADMIN — Medication 1750 MG: at 16:34

## 2024-02-23 RX ADMIN — LISINOPRIL 40 MG: 20 TABLET ORAL at 12:05

## 2024-02-23 RX ADMIN — GABAPENTIN 600 MG: 600 TABLET ORAL at 13:46

## 2024-02-23 RX ADMIN — PIPERACILLIN AND TAZOBACTAM 3375 MG: 3; .375 INJECTION, POWDER, FOR SOLUTION INTRAVENOUS at 14:25

## 2024-02-23 RX ADMIN — GABAPENTIN 600 MG: 600 TABLET ORAL at 21:12

## 2024-02-23 RX ADMIN — ENOXAPARIN SODIUM 30 MG: 100 INJECTION SUBCUTANEOUS at 21:11

## 2024-02-23 RX ADMIN — OXYCODONE 5 MG: 5 TABLET ORAL at 11:05

## 2024-02-23 RX ADMIN — PIPERACILLIN AND TAZOBACTAM 3375 MG: 3; .375 INJECTION, POWDER, FOR SOLUTION INTRAVENOUS at 00:24

## 2024-02-23 RX ADMIN — OXYCODONE 5 MG: 5 TABLET ORAL at 04:35

## 2024-02-23 RX ADMIN — OXYCODONE 5 MG: 5 TABLET ORAL at 21:12

## 2024-02-23 RX ADMIN — IOPAMIDOL 75 ML: 755 INJECTION, SOLUTION INTRAVENOUS at 08:43

## 2024-02-23 RX ADMIN — ACETAMINOPHEN 325MG 650 MG: 325 TABLET ORAL at 01:57

## 2024-02-23 RX ADMIN — METOPROLOL TARTRATE 50 MG: 50 TABLET ORAL at 12:07

## 2024-02-23 RX ADMIN — Medication 1750 MG: at 04:40

## 2024-02-23 RX ADMIN — INSULIN LISPRO 1 UNITS: 100 INJECTION, SOLUTION INTRAVENOUS; SUBCUTANEOUS at 21:23

## 2024-02-23 RX ADMIN — SODIUM CHLORIDE: 9 INJECTION, SOLUTION INTRAVENOUS at 04:38

## 2024-02-23 RX ADMIN — PIPERACILLIN AND TAZOBACTAM 3375 MG: 3; .375 INJECTION, POWDER, FOR SOLUTION INTRAVENOUS at 07:36

## 2024-02-23 RX ADMIN — PIPERACILLIN AND TAZOBACTAM 3375 MG: 3; .375 INJECTION, POWDER, FOR SOLUTION INTRAVENOUS at 23:13

## 2024-02-23 RX ADMIN — METOPROLOL TARTRATE 50 MG: 50 TABLET ORAL at 21:12

## 2024-02-23 RX ADMIN — INSULIN LISPRO 1 UNITS: 100 INJECTION, SOLUTION INTRAVENOUS; SUBCUTANEOUS at 16:34

## 2024-02-23 RX ADMIN — INSULIN LISPRO 1 UNITS: 100 INJECTION, SOLUTION INTRAVENOUS; SUBCUTANEOUS at 12:01

## 2024-02-23 ASSESSMENT — PAIN SCALES - GENERAL
PAINLEVEL_OUTOF10: 7
PAINLEVEL_OUTOF10: 7
PAINLEVEL_OUTOF10: 6
PAINLEVEL_OUTOF10: 7
PAINLEVEL_OUTOF10: 7

## 2024-02-23 ASSESSMENT — PAIN DESCRIPTION - ORIENTATION
ORIENTATION: RIGHT
ORIENTATION: RIGHT

## 2024-02-23 ASSESSMENT — PAIN DESCRIPTION - FREQUENCY: FREQUENCY: CONTINUOUS

## 2024-02-23 ASSESSMENT — PAIN DESCRIPTION - PAIN TYPE: TYPE: ACUTE PAIN

## 2024-02-23 ASSESSMENT — PAIN DESCRIPTION - DESCRIPTORS: DESCRIPTORS: ACHING;DISCOMFORT

## 2024-02-23 ASSESSMENT — PAIN DESCRIPTION - LOCATION
LOCATION: FOOT
LOCATION: FOOT

## 2024-02-23 NOTE — H&P
[facility]  Office: [phone]  Sadiq Rodrigues DO, Tamir Ortega DO, Matt Dempsey DO, Geremias Bush DO, Cristal Mcrae MD, Ayesha Morris MD, Ryan Leiva MD, Esperanza Hoang MD,  Justino Cevallos MD, Vel Castillo MD, Dorothy Maldonado MD,  Grey De La Torre DO, Shawanda Bledsoe MD, Fareed Cruz MD, Alek Rodrigues DO, Abida Servin MD,  Zeeshan Ham DO, Hien Looney MD, Bailee Albarran MD, Anna Beltran MD, Marlin Pittman MD,  Adan Anglin MD, Shannon Coronado MD, Shelli Ronquillo MD, Belkys De La Paz MD, Henri Mosley MD, Xiomara Dhillon MD, Brien Pike DO, Jonas iDana DO, Jennifer Hamilton MD,  Jim Riley MD, Shirley Waterhouse, CNP,  Paulina Matute CNP, Nicho Triana, CNP,  Lilly Marshall, JOE, Ludy Durbin, CNP, Dinah Merida, CNP, Judy Manning CNP, Leslie Llamas, CNP, Yadira Parker, CNP, Tiana Yao, PA-C, Sonal Barakat PA-C, Norma Rod, CNP, Irasema Pacheco, CNP, Erica Rivera, CNP, Shahla Joyner, CNS, Andreina Singh, CNP, Martha Torres, CNP, Tracy Schwab, CNP         [facility]   IN-PATIENT SERVICE   [facility]    HISTORY AND PHYSICAL EXAMINATION            Date:   2/23/2024  Patient name:  Braden Baptiste  Date of admission:  2/22/2024 11:13 PM  MRN:   [id number]  Account:  [id number]  YOB: 1970  PCP:    Umesh Zhang MD  Room:   [address] [place] [address]  Code Status:    Full Code    Chief Complaint:     Chief Complaint   Patient presents with    Foot Pain     Increased redness today     History Obtained From:     patient    History of Present Illness:     Braden Baptiste is a 53 y.o. Non- / non  male who presents with Foot Pain (Increased redness today)   and is admitted to the hospital for the management of Necrotizing fasciitis (HCC).    53-year-old male with past medical history of diabetes, hypertension history of diabetic right foot wound who was AT his podiatric office on Wednesday and he was told to come to

## 2024-02-23 NOTE — ED PROVIDER NOTES
[facility]   Emergency Department  Faculty Attestation       I performed a history and physical examination of the patient and discussed management with the resident. I reviewed the resident’s note and agree with the documented findings including all diagnostic interpretations and plan of care. Any areas of disagreement are noted on the chart. I was personally present for the key portions of any procedures. I have documented in the chart those procedures where I was not present during the key portions. I have reviewed the emergency nurses triage note. I agree with the chief complaint, past medical history, past surgical history, allergies, medications, social and family history as documented unless otherwise noted below.  For Physician Assistant/ Nurse Practitioner cases/documentation I have personally evaluated this patient and have completed at least one if not all key elements of the E/M (history, physical exam, and MDM). Additional findings are as noted.    Patient Name: Braden Baptiste  MRN: [id number]  : 1970  Primary Care Physician: Umesh Zhang MD    Date of evaluationa: 2024   Note Started: 11:22 PM EST    Pertinent Comments     Chief Complaint:   Chief Complaint   Patient presents with    Foot Pain     Increased redness today        Initial vitals: (If not listed, please see nursing documentation)  ED Triage Vitals   BP Temp Temp Source Pulse Respirations SpO2 Height Weight   24 2320 24 2315 24 -- --   138/79 98.5 °F (36.9 °C) Oral 93 18 98 %          HPI/PE/Impression:  This is a 53 y.o. male who presents to the Emergency Department transfer from [facility] due to right foot wound.  Had a history of diabetic foot wound on the right foot, they have been monitoring, but is acutely worsened with erythema swelling.  At outlying facility patient had x-ray that showed punctate gas on the wound possibly related

## 2024-02-23 NOTE — ED NOTES
53-year-old male known history of chronic diabetic wound lateral plantar space on the foot concern for osteomyelitis with extending erythema up to the level of the shin.  White count elevated.  X-ray shows punctate gas around the wound site likely related to chronic open wound.  Vanco Zosyn and Flagyl.  Podiatry team aware and plans for admission, possible debridement.  Ground.    Accepted by Dr. Escudero  
Podiatry at bedside  
Pt reports to the ED via private auto from [facility] with complaints of a foot wound. Pt states he has been seeing podiatry for the past 4 weeks for the wound on the bottom of his R foot. Pt states he has been having increased pain for the last day or two. Pt also states today he noticed redness to the top of the foot that appears to be growing in size. Pt does state he has a hx of DM and neuropathy. Per EMS report, pt received wound care at [facility]. EMS also reports pt receiving a dose of doxycycline and 2.5 g of vancomycin. EMS reports [facility] showed concern for cellulitis vs necrotizing fascitis. EMS also reports xray at [facility] showing punctate gas. Pt is A&O x4 and speaking in complete sentences. Pt is resting in bed comfortably, NAD noted. Pt is ambulatory at this time. Pt denies chest pain or SOB. Pt placed on SpO2 and BP monitor. Care ongoing  
The following labs were labeled with appropriate pt sticker and tubed to lab:     [] Blue     [] Lavender   [] on ice  [] Green/yellow  [] Green/black [] on ice  [] Grey  [] on ice  [] Yellow  [] Red  [] Pink  [] Type/ Screen  [] ABG  [] VBG    [] COVID-19 swab    [] Rapid  [] PCR  [] Flu swab  [] Peds Viral Panel     [] Urine Sample  [] Fecal Sample  [] Pelvic Cultures  [] Blood Cultures  [] X 2  [] STREP Cultures  [x] Wound Cultures    
The following labs were labeled with appropriate pt sticker and tubed to lab:     [x] Blue     [x] Lavender   [] on ice  [x] Green/yellow  [x] Green/black [] on ice  [] Grey  [] on ice  [] Yellow  [] Red  [] Pink  [] Type/ Screen  [] ABG  [] VBG    [] COVID-19 swab    [] Rapid  [] PCR  [] Flu swab  [] Peds Viral Panel     [] Urine Sample  [] Fecal Sample  [] Pelvic Cultures  [] Blood Cultures  [] X 2  [] STREP Cultures  [] Wound Cultures    
repeat x-ray, unsure if they will be coming in tonight or seeing him as inpatient will pend on scans [ML]   Fri Feb 23, 2024   0029 Podiatry team recommending admission. However, do not feel this is necrotizing fasciitis.  [ML]   0107 XR: IMPRESSION:  Findings consistent with necrotizing fasciitis in the region of the distal  aspect of the 4th webspace compatible with the provided history.  Soft tissue  swelling appears most pronounced dorsally.      [ML]   0130 [facility] agreeing on admission. [ML]      ED Course User Index  [ML] Olamide Keenan, DO          Skin Assessment:        Pain Score:  Pain Assessment  Pain Assessment: 0-10  Pain Level: 3      SOCIAL HISTORY       Social History     Socioeconomic History    Marital status: Single     Spouse name: None    Number of children: None    Years of education: None    Highest education level: None   Tobacco Use    Smoking status: Every Day     Types: Cigarettes    Smokeless tobacco: Never   Vaping Use    Vaping Use: Never used   Substance and Sexual Activity    Alcohol use: Yes     Comment: occcasionally     Drug use: Never       FAMILY HISTORY     History reviewed. No pertinent family history.    ALLERGIES     Patient has no known allergies.    CURRENT MEDICATIONS       Previous Medications    DAPAGLIFLOZIN (FARXIGA) 10 MG TABLET    Take 10 mg by mouth every morning    INSULIN ASPART (NOVOLOG SC)    Inject into the skin    LISINOPRIL (PRINIVIL;ZESTRIL) 40 MG TABLET    Take 40 mg by mouth daily    METFORMIN (GLUCOPHAGE) 500 MG TABLET    Take 500 mg by mouth 2 times daily (with meals)    METOPROLOL TARTRATE (LOPRESSOR) 50 MG TABLET    Take 50 mg by mouth 2 times daily    OMEPRAZOLE (PRILOSEC) 20 MG DELAYED RELEASE CAPSULE    Take 20 mg by mouth daily    PREGABALIN (LYRICA) 50 MG CAPSULE    Take 50 mg by mouth 2 times daily.    SENNOSIDES-DOCUSATE SODIUM (SENOKOT-S) 8.6-50 MG TABLET    Take 1 tablet by mouth 2 times daily    TIZANIDINE (ZANAFLEX) 4 MG TABLET    Take 1

## 2024-02-23 NOTE — ED PROVIDER NOTES
[facility]/MED SURG  Emergency Department Encounter  Emergency Medicine Resident     Pt Name:Braden Baptiste  MRN: [id number]  Birthdate 1970  Date of evaluation: 2/23/24  PCP:  Umesh Zhang MD  Note Started: 5:24 AM EST      CHIEF COMPLAINT       Chief Complaint   Patient presents with    Foot Pain     Increased redness today       HISTORY OF PRESENT ILLNESS  (Location/Symptom, Timing/Onset, Context/Setting, Quality, Duration, Modifying Factors, Severity.)      Braden Baptiste is a 53 y.o. male who presents with wound is transferred from [facility].  Patient states he has been following with Dr. Gandhi, his podiatrist, for the last month regarding wound on the bottom of his right foot.  He states that he has neuropathy so he typically does not feel his feet.  However when he has been walking around the last couple days been very painful.  Patient also noticed erythema around his foot and they have been working it monitoring it.  Podiatry sentiment [facility] emergency department where he was found to have necrotizing fasciitis.  This was discussed with team here and they agreed to accept him for transfer.  He currently reports his pain is a 7 out of 10 when walking however not having significant pain when he is laying as he does not typically feel his feet.  States that his blood sugar has been under control recently.  Current blood sugar is 153.    PAST MEDICAL / SURGICAL / SOCIAL / FAMILY HISTORY      has a past medical history of Diabetes mellitus (HCC), GERD (gastroesophageal reflux disease), and Hypertension.        has a past surgical history that includes Cardiac surgery; Throat surgery (2020); Shoulder Arthroplasty (Bilateral); Knee arthroscopy (Bilateral); Arm Surgery (Left); and cervical fusion (N/A, 2/22/2021).       Social History     Socioeconomic History    Marital status: Single     Spouse name: Not on file    Number of children: Not on file    Years of education: Not on file

## 2024-02-24 ENCOUNTER — APPOINTMENT (OUTPATIENT)
Dept: VASCULAR LAB | Age: 54
DRG: 629 | End: 2024-02-24
Payer: COMMERCIAL

## 2024-02-24 ENCOUNTER — APPOINTMENT (OUTPATIENT)
Dept: MRI IMAGING | Age: 54
DRG: 629 | End: 2024-02-24
Payer: COMMERCIAL

## 2024-02-24 PROBLEM — E11.65 UNCONTROLLED DIABETES MELLITUS WITH HYPERGLYCEMIA, WITH LONG-TERM CURRENT USE OF INSULIN (HCC): Status: ACTIVE | Noted: 2019-03-25

## 2024-02-24 PROBLEM — E11.42 CONTROLLED TYPE 2 DIABETES MELLITUS WITH DIABETIC POLYNEUROPATHY, WITH LONG-TERM CURRENT USE OF INSULIN (HCC): Status: RESOLVED | Noted: 2019-03-25 | Resolved: 2024-02-24

## 2024-02-24 PROBLEM — E11.42 PERIPHERAL SENSORY NEUROPATHY DUE TO TYPE 2 DIABETES MELLITUS (HCC): Status: ACTIVE | Noted: 2024-02-24

## 2024-02-24 PROBLEM — I25.10 CORONARY ARTERY DISEASE INVOLVING NATIVE CORONARY ARTERY OF NATIVE HEART WITHOUT ANGINA PECTORIS: Status: ACTIVE | Noted: 2024-02-24

## 2024-02-24 PROBLEM — Z79.4 CONTROLLED TYPE 2 DIABETES MELLITUS WITH DIABETIC POLYNEUROPATHY, WITH LONG-TERM CURRENT USE OF INSULIN (HCC): Status: RESOLVED | Noted: 2019-03-25 | Resolved: 2024-02-24

## 2024-02-24 LAB
ANION GAP SERPL CALCULATED.3IONS-SCNC: 10 MMOL/L (ref 9–17)
BASOPHILS # BLD: 0.11 K/UL (ref 0–0.2)
BASOPHILS NFR BLD: 1 % (ref 0–2)
BUN SERPL-MCNC: 11 MG/DL (ref 6–20)
CALCIUM SERPL-MCNC: 9.1 MG/DL (ref 8.6–10.4)
CHLORIDE SERPL-SCNC: 103 MMOL/L (ref 98–107)
CO2 SERPL-SCNC: 25 MMOL/L (ref 20–31)
CREAT SERPL-MCNC: 0.9 MG/DL (ref 0.7–1.2)
CRP SERPL HS-MCNC: 143.7 MG/L (ref 0–5)
ECHO BSA: 2.51 M2
EOSINOPHIL # BLD: 0.28 K/UL (ref 0–0.44)
EOSINOPHILS RELATIVE PERCENT: 3 % (ref 1–4)
ERYTHROCYTE [DISTWIDTH] IN BLOOD BY AUTOMATED COUNT: 12.9 % (ref 11.8–14.4)
ERYTHROCYTE [SEDIMENTATION RATE] IN BLOOD BY PHOTOMETRIC METHOD: 59 MM/HR (ref 0–20)
EST. AVERAGE GLUCOSE BLD GHB EST-MCNC: 183 MG/DL
GFR SERPL CREATININE-BSD FRML MDRD: >60 ML/MIN/1.73M2
GLUCOSE BLD-MCNC: 214 MG/DL (ref 75–110)
GLUCOSE BLD-MCNC: 239 MG/DL (ref 75–110)
GLUCOSE SERPL-MCNC: 179 MG/DL (ref 70–99)
HBA1C MFR BLD: 8 % (ref 4–6)
HCT VFR BLD AUTO: 41.6 % (ref 40.7–50.3)
HGB BLD-MCNC: 12.8 G/DL (ref 13–17)
IMM GRANULOCYTES # BLD AUTO: 0.04 K/UL (ref 0–0.3)
IMM GRANULOCYTES NFR BLD: 0 %
LYMPHOCYTES NFR BLD: 1.78 K/UL (ref 1.1–3.7)
LYMPHOCYTES RELATIVE PERCENT: 17 % (ref 24–43)
MCH RBC QN AUTO: 27.4 PG (ref 25.2–33.5)
MCHC RBC AUTO-ENTMCNC: 30.8 G/DL (ref 28.4–34.8)
MCV RBC AUTO: 88.9 FL (ref 82.6–102.9)
MONOCYTES NFR BLD: 0.87 K/UL (ref 0.1–1.2)
MONOCYTES NFR BLD: 8 % (ref 3–12)
NEUTROPHILS NFR BLD: 71 % (ref 36–65)
NEUTS SEG NFR BLD: 7.49 K/UL (ref 1.5–8.1)
NRBC BLD-RTO: 0 PER 100 WBC
PLATELET # BLD AUTO: 210 K/UL (ref 138–453)
PMV BLD AUTO: 11.3 FL (ref 8.1–13.5)
POTASSIUM SERPL-SCNC: 4.2 MMOL/L (ref 3.7–5.3)
RBC # BLD AUTO: 4.68 M/UL (ref 4.21–5.77)
SODIUM SERPL-SCNC: 138 MMOL/L (ref 135–144)
VAS LEFT ABI: 1.25
VAS LEFT ARM BP: 135 MMHG
VAS LEFT CALF PRESSURE: 164 MMHG
VAS LEFT DORSALIS PEDIS BP: 173 MMHG
VAS LEFT PTA BP: 172 MMHG
VAS LEFT TBI: 1.03
VAS LEFT THIGH PRESSURE: 166 MMHG
VAS LEFT TOE PRESSURE: 142 MMHG
VAS RIGHT ABI: 1.17
VAS RIGHT ARM BP: 138 MMHG
VAS RIGHT CALF PRESSURE: 155 MMHG
VAS RIGHT DORSALIS PEDIS BP: 143 MMHG
VAS RIGHT PTA BP: 162 MMHG
VAS RIGHT TBI: 0.98
VAS RIGHT THIGH PRESSURE: 177 MMHG
VAS RIGHT TOE PRESSURE: 135 MMHG
WBC OTHER # BLD: 10.6 K/UL (ref 3.5–11.3)

## 2024-02-24 PROCEDURE — 36415 COLL VENOUS BLD VENIPUNCTURE: CPT

## 2024-02-24 PROCEDURE — 6370000000 HC RX 637 (ALT 250 FOR IP): Performed by: INTERNAL MEDICINE

## 2024-02-24 PROCEDURE — 6360000002 HC RX W HCPCS: Performed by: INTERNAL MEDICINE

## 2024-02-24 PROCEDURE — 2580000003 HC RX 258: Performed by: NURSE PRACTITIONER

## 2024-02-24 PROCEDURE — 99231 SBSQ HOSP IP/OBS SF/LOW 25: CPT | Performed by: PODIATRIST

## 2024-02-24 PROCEDURE — 85652 RBC SED RATE AUTOMATED: CPT

## 2024-02-24 PROCEDURE — 86140 C-REACTIVE PROTEIN: CPT

## 2024-02-24 PROCEDURE — A9576 INJ PROHANCE MULTIPACK: HCPCS

## 2024-02-24 PROCEDURE — 82947 ASSAY GLUCOSE BLOOD QUANT: CPT

## 2024-02-24 PROCEDURE — 2580000003 HC RX 258: Performed by: INTERNAL MEDICINE

## 2024-02-24 PROCEDURE — 99233 SBSQ HOSP IP/OBS HIGH 50: CPT | Performed by: INTERNAL MEDICINE

## 2024-02-24 PROCEDURE — 93923 UPR/LXTR ART STDY 3+ LVLS: CPT | Performed by: STUDENT IN AN ORGANIZED HEALTH CARE EDUCATION/TRAINING PROGRAM

## 2024-02-24 PROCEDURE — 85025 COMPLETE CBC W/AUTO DIFF WBC: CPT

## 2024-02-24 PROCEDURE — 6370000000 HC RX 637 (ALT 250 FOR IP): Performed by: NURSE PRACTITIONER

## 2024-02-24 PROCEDURE — 80048 BASIC METABOLIC PNL TOTAL CA: CPT

## 2024-02-24 PROCEDURE — 6370000000 HC RX 637 (ALT 250 FOR IP): Performed by: STUDENT IN AN ORGANIZED HEALTH CARE EDUCATION/TRAINING PROGRAM

## 2024-02-24 PROCEDURE — 99222 1ST HOSP IP/OBS MODERATE 55: CPT | Performed by: INTERNAL MEDICINE

## 2024-02-24 PROCEDURE — 93923 UPR/LXTR ART STDY 3+ LVLS: CPT

## 2024-02-24 PROCEDURE — 1200000000 HC SEMI PRIVATE

## 2024-02-24 PROCEDURE — 2580000003 HC RX 258

## 2024-02-24 PROCEDURE — 6360000002 HC RX W HCPCS: Performed by: NURSE PRACTITIONER

## 2024-02-24 PROCEDURE — 83036 HEMOGLOBIN GLYCOSYLATED A1C: CPT

## 2024-02-24 PROCEDURE — 6360000004 HC RX CONTRAST MEDICATION

## 2024-02-24 PROCEDURE — 73720 MRI LWR EXTREMITY W/O&W/DYE: CPT

## 2024-02-24 RX ORDER — INSULIN GLARGINE 100 [IU]/ML
10 INJECTION, SOLUTION SUBCUTANEOUS NIGHTLY
Status: DISCONTINUED | OUTPATIENT
Start: 2024-02-24 | End: 2024-02-27

## 2024-02-24 RX ORDER — GLUCAGON 1 MG/ML
1 KIT INJECTION PRN
Status: DISCONTINUED | OUTPATIENT
Start: 2024-02-24 | End: 2024-02-29 | Stop reason: HOSPADM

## 2024-02-24 RX ORDER — ASPIRIN 81 MG/1
81 TABLET ORAL DAILY
COMMUNITY
Start: 2021-02-03

## 2024-02-24 RX ORDER — SODIUM CHLORIDE 0.9 % (FLUSH) 0.9 %
10 SYRINGE (ML) INJECTION PRN
Status: DISCONTINUED | OUTPATIENT
Start: 2024-02-24 | End: 2024-02-29 | Stop reason: HOSPADM

## 2024-02-24 RX ORDER — DEXTROSE MONOHYDRATE 100 MG/ML
INJECTION, SOLUTION INTRAVENOUS CONTINUOUS PRN
Status: DISCONTINUED | OUTPATIENT
Start: 2024-02-24 | End: 2024-02-29 | Stop reason: HOSPADM

## 2024-02-24 RX ORDER — ASPIRIN 81 MG/1
81 TABLET ORAL DAILY
Status: DISCONTINUED | OUTPATIENT
Start: 2024-02-24 | End: 2024-02-29 | Stop reason: HOSPADM

## 2024-02-24 RX ORDER — ATORVASTATIN CALCIUM 40 MG/1
40 TABLET, FILM COATED ORAL NIGHTLY
Status: DISCONTINUED | OUTPATIENT
Start: 2024-02-24 | End: 2024-02-29 | Stop reason: HOSPADM

## 2024-02-24 RX ORDER — ATORVASTATIN CALCIUM 40 MG/1
40 TABLET, FILM COATED ORAL
COMMUNITY
Start: 2020-06-11

## 2024-02-24 RX ORDER — LINEZOLID 600 MG/1
600 TABLET, FILM COATED ORAL EVERY 12 HOURS SCHEDULED
Status: DISCONTINUED | OUTPATIENT
Start: 2024-02-24 | End: 2024-02-25

## 2024-02-24 RX ADMIN — LISINOPRIL 40 MG: 20 TABLET ORAL at 09:06

## 2024-02-24 RX ADMIN — METOPROLOL TARTRATE 50 MG: 50 TABLET ORAL at 20:42

## 2024-02-24 RX ADMIN — METOPROLOL TARTRATE 50 MG: 50 TABLET ORAL at 09:06

## 2024-02-24 RX ADMIN — SODIUM CHLORIDE, PRESERVATIVE FREE 10 ML: 5 INJECTION INTRAVENOUS at 13:01

## 2024-02-24 RX ADMIN — ENOXAPARIN SODIUM 30 MG: 100 INJECTION SUBCUTANEOUS at 09:06

## 2024-02-24 RX ADMIN — GABAPENTIN 600 MG: 600 TABLET ORAL at 14:02

## 2024-02-24 RX ADMIN — INSULIN GLARGINE 10 UNITS: 100 INJECTION, SOLUTION SUBCUTANEOUS at 20:41

## 2024-02-24 RX ADMIN — SODIUM CHLORIDE, PRESERVATIVE FREE 10 ML: 5 INJECTION INTRAVENOUS at 20:43

## 2024-02-24 RX ADMIN — SODIUM CHLORIDE, PRESERVATIVE FREE 10 ML: 5 INJECTION INTRAVENOUS at 09:10

## 2024-02-24 RX ADMIN — LINEZOLID 600 MG: 600 TABLET, FILM COATED ORAL at 14:02

## 2024-02-24 RX ADMIN — INSULIN LISPRO 1 UNITS: 100 INJECTION, SOLUTION INTRAVENOUS; SUBCUTANEOUS at 13:23

## 2024-02-24 RX ADMIN — PIPERACILLIN AND TAZOBACTAM 3375 MG: 3; .375 INJECTION, POWDER, FOR SOLUTION INTRAVENOUS at 23:48

## 2024-02-24 RX ADMIN — PIPERACILLIN AND TAZOBACTAM 3375 MG: 3; .375 INJECTION, POWDER, FOR SOLUTION INTRAVENOUS at 15:07

## 2024-02-24 RX ADMIN — ATORVASTATIN CALCIUM 40 MG: 40 TABLET, FILM COATED ORAL at 20:42

## 2024-02-24 RX ADMIN — Medication 1750 MG: at 04:42

## 2024-02-24 RX ADMIN — ASPIRIN 81 MG: 81 TABLET, COATED ORAL at 09:06

## 2024-02-24 RX ADMIN — PANTOPRAZOLE SODIUM 40 MG: 40 TABLET, DELAYED RELEASE ORAL at 06:32

## 2024-02-24 RX ADMIN — PIPERACILLIN AND TAZOBACTAM 3375 MG: 3; .375 INJECTION, POWDER, FOR SOLUTION INTRAVENOUS at 07:31

## 2024-02-24 RX ADMIN — LINEZOLID 600 MG: 600 TABLET, FILM COATED ORAL at 20:42

## 2024-02-24 RX ADMIN — GABAPENTIN 600 MG: 600 TABLET ORAL at 20:42

## 2024-02-24 RX ADMIN — ENOXAPARIN SODIUM 30 MG: 100 INJECTION SUBCUTANEOUS at 20:42

## 2024-02-24 RX ADMIN — GABAPENTIN 600 MG: 600 TABLET ORAL at 09:06

## 2024-02-24 RX ADMIN — GADOTERIDOL 20 ML: 279.3 INJECTION, SOLUTION INTRAVENOUS at 13:01

## 2024-02-24 RX ADMIN — INSULIN LISPRO 1 UNITS: 100 INJECTION, SOLUTION INTRAVENOUS; SUBCUTANEOUS at 16:37

## 2024-02-24 RX ADMIN — OXYCODONE 5 MG: 5 TABLET ORAL at 15:51

## 2024-02-24 RX ADMIN — OXYCODONE 5 MG: 5 TABLET ORAL at 22:05

## 2024-02-24 ASSESSMENT — PAIN SCALES - GENERAL
PAINLEVEL_OUTOF10: 7
PAINLEVEL_OUTOF10: 4
PAINLEVEL_OUTOF10: 6
PAINLEVEL_OUTOF10: 0
PAINLEVEL_OUTOF10: 7

## 2024-02-24 ASSESSMENT — PAIN DESCRIPTION - LOCATION: LOCATION: FOOT

## 2024-02-24 ASSESSMENT — PAIN DESCRIPTION - DESCRIPTORS: DESCRIPTORS: ACHING

## 2024-02-24 ASSESSMENT — PAIN DESCRIPTION - ORIENTATION: ORIENTATION: RIGHT

## 2024-02-24 ASSESSMENT — PAIN DESCRIPTION - PAIN TYPE: TYPE: ACUTE PAIN

## 2024-02-24 NOTE — PLAN OF CARE
Problem: Discharge Planning  Goal: Discharge to home or other facility with appropriate resources  Outcome: Progressing  Flowsheets (Taken 2/23/2024 2032)  Discharge to home or other facility with appropriate resources: Identify barriers to discharge with patient and caregiver     Problem: Pain  Goal: Verbalizes/displays adequate comfort level or baseline comfort level  2/23/2024 2356 by Kenzie Carrion RN  Outcome: Progressing  2/23/2024 1748 by Katia Ceron RN  Outcome: Progressing     Problem: Safety - Adult  Goal: Free from fall injury  2/23/2024 1748 by Katia Ceron RN  Outcome: Progressing     Problem: ABCDS Injury Assessment  Goal: Absence of physical injury  2/23/2024 1748 by Katia Ceron RN  Outcome: Progressing

## 2024-02-24 NOTE — CONSULTS
[facility] -   Infectious diseases evaluation  admission date 2/22/2024    reason for consultation:   R diab foot facsitis     Impression :   Current:  R diabetic foot cellulitis and subcutaneous emphysema  R 1st digit proximal and distal phalanx osteomyelitis   R foot abscess 5th MTS area - drained bedside 2/24  Prolonged CRP and elevated ESR    Other:    Discussion / summary of stay / plan of care/ Recommendations:     HENCE:   Per podiatry, no clear gas formation  and I/D planned Monday  Awaiting MRI foot   Keep zosyn - add zyvox   Defer vanco to preserve the kidney    Infection Control Recommendations   [place] Precautions  Contact Isolation       Antimicrobial Stewardship Recommendations   Simplification of therapy  Targeted therapy      History of Present Illness:   Initial history:  Braden Baptiste is a 53 y.o.-year-old male   Who follows with Dr. Gandhi as outpatient, for a wound over the sore area, on the left fifth metatarsal bone.  Over the last few days that foot became red swollen and increase in the pain and warmth, no associated fever.  Came in to [facility] where a CT scan suggested subcutaneous emphysema with osteomyelitis in that area.  MRI is pending at this time, podiatry joint a small abscess around that swollen wound, getting bloody pus, and no surgical plans for now until Monday.  Podiatry do not feel that there is any fasciitis ongoing at this point.  Images are in the system from this morning, the patient feels good otherwise there is no cellulitis extending in the upper leg or around the ankle.                    Interval changes  2/24/2024   Patient Vitals for the past 8 hrs:   BP Temp Temp src Pulse Resp SpO2   02/24/24 0906 137/76 -- -- 78 -- --   02/24/24 0800 137/76 98.1 °F (36.7 °C) Temporal 78 18 96 %         Summary of relevant labs:  Labs:  W 11      A1C  
Date    SEDRATE 43 (H) 02/23/2024     Lab Results   Component Value Date    .8 (H) 02/23/2024         Lower Extremity Physical Exam:  Vascular: DP and PT pulses are palpable. CFT <5 seconds to all digits.  Hair growth is not present to the level of the digits.  Moderate pitting edema to the right foot.      Neuro: Saph/sural/SP/DP/plantar sensation not intact to light touch.    Musculoskeletal: Muscle strength is adequate ROM, adequate strength to all lower extremity muscle groups. Gross deformity is hammer digit deformity to the fifth toe.    Dermatologic: Full thickness ulcer #1 located plantar to the fifth metatarsal head on the right foot and measures approximately 2 cm x 2 cm. Base is keratotic. Periwound skin is erythematous.  Minimal serosanguineous drainage noted with  associated mal odor. Erythema present to the right forefoot extending to the Chopart joint with  associated increase in warmth.  Wound does probe to bone, without sinus tracking or undermining.  There is no crepitus or induration present.  No interdigital maceration.maceration absent.       Clinical:                             Imaging:   CT FOOT RIGHT W CONTRAST   Final Result   Skin defect along the plantar aspect of the foot between the 4th and 5th   metatarsal heads with associated subcutaneous emphysema and skin   thickening/fat stranding suggestive of cellulitis.  No discrete fluid   collection.      Age-indeterminate osteomyelitis involving the 1st digit proximal phalanx.   Chronic osteomyelitis involving the 1st digit distal phalanx.  If clinically   warranted, further characterization could be obtained with an MRI of the   forefoot.      Age indeterminate avulsion fracture adjacent to the lateral malleolar tip.      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         XR FOOT RIGHT (MIN 3 VIEWS)   Final Result   Findings consistent with necrotizing fasciitis in the region of the distal   aspect of the 4th webspace

## 2024-02-24 NOTE — CARE COORDINATION
Case Management Assessment  Initial Evaluation    Date/Time of Evaluation: 2/24/2024 10:08 AM  Assessment Completed by: Cyndi Reveles    If patient is discharged prior to next notation, then this note serves as note for discharge by case management.    Patient Name: Braden Baptiste                   YOB: 1970  Diagnosis: Necrotizing fasciitis (HCC) [M72.6]                   Date / Time: 2/22/2024 11:13 PM    Patient Admission Status: Inpatient   Readmission Risk (Low < 19, Mod (19-27), High > 27): Readmission Risk Score: 7.5    Current PCP: Umesh Zhang MD  PCP verified by CM? (P) Yes (Dr mUesh Zhang)    Chart Reviewed: Yes      History Provided by: (P) Patient  Patient Orientation: (P) Alert and Oriented, Person, Place, Situation, Self    Patient Cognition: (P) Alert    Hospitalization in the last 30 days (Readmission):  No    If yes, Readmission Assessment in CM Navigator will be completed.    Advance Directives:      Code Status: Full Code   Patient's Primary Decision Maker is: (P) Legal Next of Kin      Discharge Planning:    Patient lives with: (P) Parent Type of Home: (P) House  Primary Care Giver: (P) Self  Patient Support Systems include: (P) Parent   Current Financial resources: (P) Other (Comment) (private insurance BC/BS)  Current community resources:    Current services prior to admission: (P) Durable Medical Equipment            Current DME: (P) Miky Pickard            Type of Home Care services:  (P) None    ADLS  Prior functional level: (P) Independent in ADLs/IADLs  Current functional level: (P) Assistance with the following:, Cooking, Housework    PT AM-PAC: 24 /24  OT AM-PAC:   /24    Family can provide assistance at DC: (P) Yes  Would you like Case Management to discuss the discharge plan with any other family members/significant others, and if so, who? (P) No  Plans to Return to Present Housing: (P) Yes  Other Identified Issues/Barriers to RETURNING to current housing:

## 2024-02-25 PROBLEM — M86.9 PYOGENIC INFLAMMATION OF BONE (HCC): Status: ACTIVE | Noted: 2024-02-25

## 2024-02-25 PROBLEM — L97.509 TYPE 2 DIABETES MELLITUS WITH FOOT ULCER, WITH LONG-TERM CURRENT USE OF INSULIN (HCC): Status: ACTIVE | Noted: 2024-02-25

## 2024-02-25 PROBLEM — E11.621 TYPE 2 DIABETES MELLITUS WITH FOOT ULCER, WITH LONG-TERM CURRENT USE OF INSULIN (HCC): Status: ACTIVE | Noted: 2024-02-25

## 2024-02-25 PROBLEM — Z79.4 TYPE 2 DIABETES MELLITUS WITH FOOT ULCER, WITH LONG-TERM CURRENT USE OF INSULIN (HCC): Status: ACTIVE | Noted: 2024-02-25

## 2024-02-25 PROBLEM — L97.512 ULCERATED, FOOT, RIGHT, WITH FAT LAYER EXPOSED (HCC): Status: ACTIVE | Noted: 2024-02-25

## 2024-02-25 PROBLEM — L08.9 TYPE 2 DIABETES MELLITUS WITH RIGHT DIABETIC FOOT INFECTION (HCC): Status: ACTIVE | Noted: 2024-02-25

## 2024-02-25 PROBLEM — L97.516 DIABETIC ULCER OF TOE OF RIGHT FOOT ASSOCIATED WITH DIABETES MELLITUS DUE TO UNDERLYING CONDITION, WITH BONE INVOLVEMENT WITHOUT EVIDENCE OF NECROSIS (HCC): Status: ACTIVE | Noted: 2024-02-25

## 2024-02-25 PROBLEM — E11.628 TYPE 2 DIABETES MELLITUS WITH RIGHT DIABETIC FOOT INFECTION (HCC): Status: ACTIVE | Noted: 2024-02-25

## 2024-02-25 PROBLEM — E08.621 DIABETIC ULCER OF TOE OF RIGHT FOOT ASSOCIATED WITH DIABETES MELLITUS DUE TO UNDERLYING CONDITION, WITH BONE INVOLVEMENT WITHOUT EVIDENCE OF NECROSIS (HCC): Status: ACTIVE | Noted: 2024-02-25

## 2024-02-25 LAB
ANION GAP SERPL CALCULATED.3IONS-SCNC: 10 MMOL/L (ref 9–17)
BASOPHILS # BLD: 0.1 K/UL (ref 0–0.2)
BASOPHILS NFR BLD: 1 % (ref 0–2)
BUN SERPL-MCNC: 10 MG/DL (ref 6–20)
CALCIUM SERPL-MCNC: 8.8 MG/DL (ref 8.6–10.4)
CHLORIDE SERPL-SCNC: 104 MMOL/L (ref 98–107)
CO2 SERPL-SCNC: 22 MMOL/L (ref 20–31)
CREAT SERPL-MCNC: 0.8 MG/DL (ref 0.7–1.2)
CRP SERPL HS-MCNC: 127.7 MG/L (ref 0–5)
EOSINOPHIL # BLD: 0.29 K/UL (ref 0–0.44)
EOSINOPHILS RELATIVE PERCENT: 3 % (ref 1–4)
ERYTHROCYTE [DISTWIDTH] IN BLOOD BY AUTOMATED COUNT: 12.9 % (ref 11.8–14.4)
ERYTHROCYTE [SEDIMENTATION RATE] IN BLOOD BY PHOTOMETRIC METHOD: 54 MM/HR (ref 0–20)
GFR SERPL CREATININE-BSD FRML MDRD: >60 ML/MIN/1.73M2
GLUCOSE SERPL-MCNC: 181 MG/DL (ref 70–99)
HCT VFR BLD AUTO: 40.1 % (ref 40.7–50.3)
HGB BLD-MCNC: 12.2 G/DL (ref 13–17)
IMM GRANULOCYTES # BLD AUTO: 0.03 K/UL (ref 0–0.3)
IMM GRANULOCYTES NFR BLD: 0 %
LYMPHOCYTES NFR BLD: 1.61 K/UL (ref 1.1–3.7)
LYMPHOCYTES RELATIVE PERCENT: 17 % (ref 24–43)
MCH RBC QN AUTO: 27.4 PG (ref 25.2–33.5)
MCHC RBC AUTO-ENTMCNC: 30.4 G/DL (ref 28.4–34.8)
MCV RBC AUTO: 90.1 FL (ref 82.6–102.9)
MONOCYTES NFR BLD: 0.8 K/UL (ref 0.1–1.2)
MONOCYTES NFR BLD: 9 % (ref 3–12)
NEUTROPHILS NFR BLD: 70 % (ref 36–65)
NEUTS SEG NFR BLD: 6.57 K/UL (ref 1.5–8.1)
NRBC BLD-RTO: 0 PER 100 WBC
PLATELET # BLD AUTO: 204 K/UL (ref 138–453)
PMV BLD AUTO: 11.3 FL (ref 8.1–13.5)
POTASSIUM SERPL-SCNC: 3.8 MMOL/L (ref 3.7–5.3)
RBC # BLD AUTO: 4.45 M/UL (ref 4.21–5.77)
SODIUM SERPL-SCNC: 136 MMOL/L (ref 135–144)
WBC OTHER # BLD: 9.4 K/UL (ref 3.5–11.3)

## 2024-02-25 PROCEDURE — 85652 RBC SED RATE AUTOMATED: CPT

## 2024-02-25 PROCEDURE — 6360000002 HC RX W HCPCS: Performed by: NURSE PRACTITIONER

## 2024-02-25 PROCEDURE — 85025 COMPLETE CBC W/AUTO DIFF WBC: CPT

## 2024-02-25 PROCEDURE — 99232 SBSQ HOSP IP/OBS MODERATE 35: CPT | Performed by: STUDENT IN AN ORGANIZED HEALTH CARE EDUCATION/TRAINING PROGRAM

## 2024-02-25 PROCEDURE — 99231 SBSQ HOSP IP/OBS SF/LOW 25: CPT | Performed by: PODIATRIST

## 2024-02-25 PROCEDURE — 6370000000 HC RX 637 (ALT 250 FOR IP): Performed by: INTERNAL MEDICINE

## 2024-02-25 PROCEDURE — 2580000003 HC RX 258: Performed by: NURSE PRACTITIONER

## 2024-02-25 PROCEDURE — 6370000000 HC RX 637 (ALT 250 FOR IP): Performed by: NURSE PRACTITIONER

## 2024-02-25 PROCEDURE — 1200000000 HC SEMI PRIVATE

## 2024-02-25 PROCEDURE — 36415 COLL VENOUS BLD VENIPUNCTURE: CPT

## 2024-02-25 PROCEDURE — 86140 C-REACTIVE PROTEIN: CPT

## 2024-02-25 PROCEDURE — 6370000000 HC RX 637 (ALT 250 FOR IP): Performed by: STUDENT IN AN ORGANIZED HEALTH CARE EDUCATION/TRAINING PROGRAM

## 2024-02-25 PROCEDURE — 80048 BASIC METABOLIC PNL TOTAL CA: CPT

## 2024-02-25 RX ADMIN — LINEZOLID 600 MG: 600 TABLET, FILM COATED ORAL at 09:11

## 2024-02-25 RX ADMIN — LISINOPRIL 40 MG: 20 TABLET ORAL at 09:08

## 2024-02-25 RX ADMIN — METOPROLOL TARTRATE 50 MG: 50 TABLET ORAL at 21:13

## 2024-02-25 RX ADMIN — PIPERACILLIN AND TAZOBACTAM 3375 MG: 3; .375 INJECTION, POWDER, FOR SOLUTION INTRAVENOUS at 15:49

## 2024-02-25 RX ADMIN — ENOXAPARIN SODIUM 30 MG: 100 INJECTION SUBCUTANEOUS at 09:12

## 2024-02-25 RX ADMIN — GABAPENTIN 600 MG: 600 TABLET ORAL at 09:08

## 2024-02-25 RX ADMIN — INSULIN LISPRO 3 UNITS: 100 INJECTION, SOLUTION INTRAVENOUS; SUBCUTANEOUS at 11:35

## 2024-02-25 RX ADMIN — ASPIRIN 81 MG: 81 TABLET, COATED ORAL at 09:08

## 2024-02-25 RX ADMIN — GABAPENTIN 600 MG: 600 TABLET ORAL at 13:00

## 2024-02-25 RX ADMIN — INSULIN LISPRO 1 UNITS: 100 INJECTION, SOLUTION INTRAVENOUS; SUBCUTANEOUS at 17:07

## 2024-02-25 RX ADMIN — OXYCODONE 5 MG: 5 TABLET ORAL at 18:17

## 2024-02-25 RX ADMIN — PIPERACILLIN AND TAZOBACTAM 3375 MG: 3; .375 INJECTION, POWDER, FOR SOLUTION INTRAVENOUS at 23:45

## 2024-02-25 RX ADMIN — INSULIN GLARGINE 10 UNITS: 100 INJECTION, SOLUTION SUBCUTANEOUS at 21:22

## 2024-02-25 RX ADMIN — ATORVASTATIN CALCIUM 40 MG: 40 TABLET, FILM COATED ORAL at 21:12

## 2024-02-25 RX ADMIN — PANTOPRAZOLE SODIUM 40 MG: 40 TABLET, DELAYED RELEASE ORAL at 06:34

## 2024-02-25 RX ADMIN — METOPROLOL TARTRATE 50 MG: 50 TABLET ORAL at 09:09

## 2024-02-25 RX ADMIN — GABAPENTIN 600 MG: 600 TABLET ORAL at 21:12

## 2024-02-25 RX ADMIN — PIPERACILLIN AND TAZOBACTAM 3375 MG: 3; .375 INJECTION, POWDER, FOR SOLUTION INTRAVENOUS at 06:36

## 2024-02-25 RX ADMIN — OXYCODONE 5 MG: 5 TABLET ORAL at 12:58

## 2024-02-25 ASSESSMENT — PAIN DESCRIPTION - ORIENTATION
ORIENTATION: RIGHT
ORIENTATION: RIGHT

## 2024-02-25 ASSESSMENT — PAIN SCALES - GENERAL
PAINLEVEL_OUTOF10: 4
PAINLEVEL_OUTOF10: 4
PAINLEVEL_OUTOF10: 7
PAINLEVEL_OUTOF10: 4
PAINLEVEL_OUTOF10: 7

## 2024-02-25 ASSESSMENT — PAIN DESCRIPTION - LOCATION
LOCATION: FOOT
LOCATION: LEG

## 2024-02-25 ASSESSMENT — PAIN - FUNCTIONAL ASSESSMENT
PAIN_FUNCTIONAL_ASSESSMENT: ACTIVITIES ARE NOT PREVENTED
PAIN_FUNCTIONAL_ASSESSMENT: ACTIVITIES ARE NOT PREVENTED

## 2024-02-25 ASSESSMENT — PAIN DESCRIPTION - DESCRIPTORS
DESCRIPTORS: ACHING
DESCRIPTORS: ACHING

## 2024-02-25 NOTE — PLAN OF CARE
Foot cx w MSSA and kleb S ceftriaxone    Plan    Stop zyvox  Keep zosyn till after surgery    Deann Knowles MD. Infectious Diseases

## 2024-02-26 ENCOUNTER — ANESTHESIA EVENT (OUTPATIENT)
Dept: OPERATING ROOM | Age: 54
DRG: 629 | End: 2024-02-26
Payer: COMMERCIAL

## 2024-02-26 ENCOUNTER — ANESTHESIA (OUTPATIENT)
Dept: OPERATING ROOM | Age: 54
DRG: 629 | End: 2024-02-26
Payer: COMMERCIAL

## 2024-02-26 PROBLEM — L03.115 CELLULITIS OF RIGHT FOOT: Status: ACTIVE | Noted: 2024-02-26

## 2024-02-26 PROBLEM — R79.82 CRP ELEVATED: Status: ACTIVE | Noted: 2024-02-26

## 2024-02-26 PROBLEM — L02.611 ABSCESS OF RIGHT FOOT: Status: ACTIVE | Noted: 2024-02-26

## 2024-02-26 PROBLEM — R70.0 ESR RAISED: Status: ACTIVE | Noted: 2024-02-26

## 2024-02-26 LAB
BASOPHILS # BLD: 0.08 K/UL (ref 0–0.2)
BASOPHILS NFR BLD: 1 % (ref 0–2)
CRP SERPL HS-MCNC: 111.3 MG/L (ref 0–5)
EOSINOPHIL # BLD: 0.36 K/UL (ref 0–0.44)
EOSINOPHILS RELATIVE PERCENT: 4 % (ref 1–4)
ERYTHROCYTE [DISTWIDTH] IN BLOOD BY AUTOMATED COUNT: 12.8 % (ref 11.8–14.4)
ERYTHROCYTE [SEDIMENTATION RATE] IN BLOOD BY PHOTOMETRIC METHOD: 62 MM/HR (ref 0–20)
GLUCOSE BLD-MCNC: 109 MG/DL (ref 75–110)
HCT VFR BLD AUTO: 38.7 % (ref 40.7–50.3)
HGB BLD-MCNC: 12 G/DL (ref 13–17)
IMM GRANULOCYTES # BLD AUTO: 0.04 K/UL (ref 0–0.3)
IMM GRANULOCYTES NFR BLD: 0 %
LYMPHOCYTES NFR BLD: 1.67 K/UL (ref 1.1–3.7)
LYMPHOCYTES RELATIVE PERCENT: 16 % (ref 24–43)
MCH RBC QN AUTO: 27.5 PG (ref 25.2–33.5)
MCHC RBC AUTO-ENTMCNC: 31 G/DL (ref 28.4–34.8)
MCV RBC AUTO: 88.6 FL (ref 82.6–102.9)
MICROORGANISM SPEC CULT: ABNORMAL
MICROORGANISM/AGENT SPEC: ABNORMAL
MICROORGANISM/AGENT SPEC: ABNORMAL
MONOCYTES NFR BLD: 0.97 K/UL (ref 0.1–1.2)
MONOCYTES NFR BLD: 9 % (ref 3–12)
NEUTROPHILS NFR BLD: 70 % (ref 36–65)
NEUTS SEG NFR BLD: 7.3 K/UL (ref 1.5–8.1)
NRBC BLD-RTO: 0 PER 100 WBC
PLATELET # BLD AUTO: 215 K/UL (ref 138–453)
PMV BLD AUTO: 11.1 FL (ref 8.1–13.5)
RBC # BLD AUTO: 4.37 M/UL (ref 4.21–5.77)
SPECIMEN DESCRIPTION: ABNORMAL
WBC OTHER # BLD: 10.4 K/UL (ref 3.5–11.3)

## 2024-02-26 PROCEDURE — 87102 FUNGUS ISOLATION CULTURE: CPT

## 2024-02-26 PROCEDURE — 6370000000 HC RX 637 (ALT 250 FOR IP)

## 2024-02-26 PROCEDURE — 87075 CULTR BACTERIA EXCEPT BLOOD: CPT

## 2024-02-26 PROCEDURE — 0QBN0ZX EXCISION OF RIGHT METATARSAL, OPEN APPROACH, DIAGNOSTIC: ICD-10-PCS | Performed by: PODIATRIST

## 2024-02-26 PROCEDURE — 10061 I&D ABSCESS COMP/MULTIPLE: CPT | Performed by: PODIATRIST

## 2024-02-26 PROCEDURE — 2580000003 HC RX 258

## 2024-02-26 PROCEDURE — 85025 COMPLETE CBC W/AUTO DIFF WBC: CPT

## 2024-02-26 PROCEDURE — 87186 SC STD MICRODIL/AGAR DIL: CPT

## 2024-02-26 PROCEDURE — 87206 SMEAR FLUORESCENT/ACID STAI: CPT

## 2024-02-26 PROCEDURE — 85652 RBC SED RATE AUTOMATED: CPT

## 2024-02-26 PROCEDURE — 2500000003 HC RX 250 WO HCPCS

## 2024-02-26 PROCEDURE — 6360000002 HC RX W HCPCS

## 2024-02-26 PROCEDURE — 2500000003 HC RX 250 WO HCPCS: Performed by: PODIATRIST

## 2024-02-26 PROCEDURE — 99232 SBSQ HOSP IP/OBS MODERATE 35: CPT | Performed by: INTERNAL MEDICINE

## 2024-02-26 PROCEDURE — 87205 SMEAR GRAM STAIN: CPT

## 2024-02-26 PROCEDURE — 7100000000 HC PACU RECOVERY - FIRST 15 MIN: Performed by: PODIATRIST

## 2024-02-26 PROCEDURE — 6370000000 HC RX 637 (ALT 250 FOR IP): Performed by: STUDENT IN AN ORGANIZED HEALTH CARE EDUCATION/TRAINING PROGRAM

## 2024-02-26 PROCEDURE — 86140 C-REACTIVE PROTEIN: CPT

## 2024-02-26 PROCEDURE — 2580000003 HC RX 258: Performed by: NURSE PRACTITIONER

## 2024-02-26 PROCEDURE — 87077 CULTURE AEROBIC IDENTIFY: CPT

## 2024-02-26 PROCEDURE — 6360000002 HC RX W HCPCS: Performed by: NURSE PRACTITIONER

## 2024-02-26 PROCEDURE — 6370000000 HC RX 637 (ALT 250 FOR IP): Performed by: NURSE PRACTITIONER

## 2024-02-26 PROCEDURE — 82947 ASSAY GLUCOSE BLOOD QUANT: CPT

## 2024-02-26 PROCEDURE — 87070 CULTURE OTHR SPECIMN AEROBIC: CPT

## 2024-02-26 PROCEDURE — 3700000001 HC ADD 15 MINUTES (ANESTHESIA): Performed by: PODIATRIST

## 2024-02-26 PROCEDURE — 86403 PARTICLE AGGLUT ANTBDY SCRN: CPT

## 2024-02-26 PROCEDURE — 36415 COLL VENOUS BLD VENIPUNCTURE: CPT

## 2024-02-26 PROCEDURE — 20240 BONE BIOPSY OPEN SUPERFICIAL: CPT | Performed by: PODIATRIST

## 2024-02-26 PROCEDURE — 3700000000 HC ANESTHESIA ATTENDED CARE: Performed by: PODIATRIST

## 2024-02-26 PROCEDURE — 2709999900 HC NON-CHARGEABLE SUPPLY: Performed by: PODIATRIST

## 2024-02-26 PROCEDURE — 3600000003 HC SURGERY LEVEL 3 BASE: Performed by: PODIATRIST

## 2024-02-26 PROCEDURE — 0J9Q3ZZ DRAINAGE OF RIGHT FOOT SUBCUTANEOUS TISSUE AND FASCIA, PERCUTANEOUS APPROACH: ICD-10-PCS | Performed by: PODIATRIST

## 2024-02-26 PROCEDURE — 1200000000 HC SEMI PRIVATE

## 2024-02-26 PROCEDURE — 3600000013 HC SURGERY LEVEL 3 ADDTL 15MIN: Performed by: PODIATRIST

## 2024-02-26 PROCEDURE — 7100000001 HC PACU RECOVERY - ADDTL 15 MIN: Performed by: PODIATRIST

## 2024-02-26 PROCEDURE — 87176 TISSUE HOMOGENIZATION CULTR: CPT

## 2024-02-26 PROCEDURE — 88307 TISSUE EXAM BY PATHOLOGIST: CPT

## 2024-02-26 PROCEDURE — 88311 DECALCIFY TISSUE: CPT

## 2024-02-26 RX ORDER — ALBUTEROL SULFATE 2.5 MG/3ML
2.5 SOLUTION RESPIRATORY (INHALATION) EVERY 8 HOURS PRN
Status: DISCONTINUED | OUTPATIENT
Start: 2024-02-26 | End: 2024-02-26 | Stop reason: HOSPADM

## 2024-02-26 RX ORDER — LIDOCAINE HYDROCHLORIDE 10 MG/ML
INJECTION, SOLUTION EPIDURAL; INFILTRATION; INTRACAUDAL; PERINEURAL PRN
Status: DISCONTINUED | OUTPATIENT
Start: 2024-02-26 | End: 2024-02-26 | Stop reason: ALTCHOICE

## 2024-02-26 RX ORDER — ALBUTEROL SULFATE 90 UG/1
2 AEROSOL, METERED RESPIRATORY (INHALATION) EVERY 6 HOURS PRN
Status: DISCONTINUED | OUTPATIENT
Start: 2024-02-26 | End: 2024-02-26 | Stop reason: HOSPADM

## 2024-02-26 RX ORDER — MIDAZOLAM HYDROCHLORIDE 1 MG/ML
INJECTION INTRAMUSCULAR; INTRAVENOUS PRN
Status: DISCONTINUED | OUTPATIENT
Start: 2024-02-26 | End: 2024-02-26 | Stop reason: SDUPTHER

## 2024-02-26 RX ORDER — PROPOFOL 10 MG/ML
INJECTION, EMULSION INTRAVENOUS PRN
Status: DISCONTINUED | OUTPATIENT
Start: 2024-02-26 | End: 2024-02-26 | Stop reason: SDUPTHER

## 2024-02-26 RX ORDER — SODIUM CHLORIDE, SODIUM LACTATE, POTASSIUM CHLORIDE, CALCIUM CHLORIDE 600; 310; 30; 20 MG/100ML; MG/100ML; MG/100ML; MG/100ML
INJECTION, SOLUTION INTRAVENOUS CONTINUOUS
Status: DISCONTINUED | OUTPATIENT
Start: 2024-02-26 | End: 2024-02-26 | Stop reason: HOSPADM

## 2024-02-26 RX ORDER — SODIUM CHLORIDE 0.9 % (FLUSH) 0.9 %
5-40 SYRINGE (ML) INJECTION EVERY 12 HOURS SCHEDULED
Status: DISCONTINUED | OUTPATIENT
Start: 2024-02-26 | End: 2024-02-26 | Stop reason: HOSPADM

## 2024-02-26 RX ORDER — SCOLOPAMINE TRANSDERMAL SYSTEM 1 MG/1
1 PATCH, EXTENDED RELEASE TRANSDERMAL ONCE
Status: DISCONTINUED | OUTPATIENT
Start: 2024-02-26 | End: 2024-02-26 | Stop reason: HOSPADM

## 2024-02-26 RX ORDER — FENTANYL CITRATE 50 UG/ML
INJECTION, SOLUTION INTRAMUSCULAR; INTRAVENOUS PRN
Status: DISCONTINUED | OUTPATIENT
Start: 2024-02-26 | End: 2024-02-26 | Stop reason: SDUPTHER

## 2024-02-26 RX ORDER — DEXAMETHASONE SODIUM PHOSPHATE 4 MG/ML
INJECTION, SOLUTION INTRA-ARTICULAR; INTRALESIONAL; INTRAMUSCULAR; INTRAVENOUS; SOFT TISSUE PRN
Status: DISCONTINUED | OUTPATIENT
Start: 2024-02-26 | End: 2024-02-26 | Stop reason: SDUPTHER

## 2024-02-26 RX ORDER — SODIUM CHLORIDE 0.9 % (FLUSH) 0.9 %
5-40 SYRINGE (ML) INJECTION PRN
Status: DISCONTINUED | OUTPATIENT
Start: 2024-02-26 | End: 2024-02-26 | Stop reason: HOSPADM

## 2024-02-26 RX ORDER — SODIUM CHLORIDE, SODIUM LACTATE, POTASSIUM CHLORIDE, CALCIUM CHLORIDE 600; 310; 30; 20 MG/100ML; MG/100ML; MG/100ML; MG/100ML
INJECTION, SOLUTION INTRAVENOUS CONTINUOUS PRN
Status: DISCONTINUED | OUTPATIENT
Start: 2024-02-26 | End: 2024-02-26 | Stop reason: SDUPTHER

## 2024-02-26 RX ORDER — MIDAZOLAM HYDROCHLORIDE 2 MG/2ML
1 INJECTION, SOLUTION INTRAMUSCULAR; INTRAVENOUS EVERY 10 MIN PRN
Status: DISCONTINUED | OUTPATIENT
Start: 2024-02-26 | End: 2024-02-26 | Stop reason: HOSPADM

## 2024-02-26 RX ORDER — FENTANYL CITRATE 50 UG/ML
25 INJECTION, SOLUTION INTRAMUSCULAR; INTRAVENOUS
Status: DISCONTINUED | OUTPATIENT
Start: 2024-02-26 | End: 2024-02-26 | Stop reason: HOSPADM

## 2024-02-26 RX ORDER — FENTANYL CITRATE 50 UG/ML
50 INJECTION, SOLUTION INTRAMUSCULAR; INTRAVENOUS EVERY 5 MIN PRN
Status: DISCONTINUED | OUTPATIENT
Start: 2024-02-26 | End: 2024-02-26 | Stop reason: HOSPADM

## 2024-02-26 RX ORDER — LIDOCAINE HYDROCHLORIDE 10 MG/ML
INJECTION, SOLUTION EPIDURAL; INFILTRATION; INTRACAUDAL; PERINEURAL PRN
Status: DISCONTINUED | OUTPATIENT
Start: 2024-02-26 | End: 2024-02-26 | Stop reason: SDUPTHER

## 2024-02-26 RX ORDER — FENTANYL CITRATE 50 UG/ML
50 INJECTION, SOLUTION INTRAMUSCULAR; INTRAVENOUS
Status: DISCONTINUED | OUTPATIENT
Start: 2024-02-26 | End: 2024-02-26 | Stop reason: HOSPADM

## 2024-02-26 RX ORDER — ONDANSETRON 2 MG/ML
4 INJECTION INTRAMUSCULAR; INTRAVENOUS
Status: DISCONTINUED | OUTPATIENT
Start: 2024-02-26 | End: 2024-02-26 | Stop reason: HOSPADM

## 2024-02-26 RX ORDER — FENTANYL CITRATE 50 UG/ML
25 INJECTION, SOLUTION INTRAMUSCULAR; INTRAVENOUS EVERY 5 MIN PRN
Status: DISCONTINUED | OUTPATIENT
Start: 2024-02-26 | End: 2024-02-26 | Stop reason: HOSPADM

## 2024-02-26 RX ORDER — DIPHENHYDRAMINE HYDROCHLORIDE 50 MG/ML
12.5 INJECTION INTRAMUSCULAR; INTRAVENOUS
Status: DISCONTINUED | OUTPATIENT
Start: 2024-02-26 | End: 2024-02-26 | Stop reason: HOSPADM

## 2024-02-26 RX ORDER — SODIUM CHLORIDE 9 MG/ML
INJECTION, SOLUTION INTRAVENOUS PRN
Status: DISCONTINUED | OUTPATIENT
Start: 2024-02-26 | End: 2024-02-26 | Stop reason: HOSPADM

## 2024-02-26 RX ORDER — PHENYLEPHRINE HCL IN 0.9% NACL 1 MG/10 ML
SYRINGE (ML) INTRAVENOUS PRN
Status: DISCONTINUED | OUTPATIENT
Start: 2024-02-26 | End: 2024-02-26 | Stop reason: SDUPTHER

## 2024-02-26 RX ORDER — CEFAZOLIN SODIUM 1 G/3ML
INJECTION, POWDER, FOR SOLUTION INTRAMUSCULAR; INTRAVENOUS PRN
Status: DISCONTINUED | OUTPATIENT
Start: 2024-02-26 | End: 2024-02-26 | Stop reason: SDUPTHER

## 2024-02-26 RX ADMIN — LIDOCAINE HYDROCHLORIDE 100 MG: 10 INJECTION, SOLUTION EPIDURAL; INFILTRATION; INTRACAUDAL; PERINEURAL at 14:23

## 2024-02-26 RX ADMIN — SODIUM CHLORIDE, POTASSIUM CHLORIDE, SODIUM LACTATE AND CALCIUM CHLORIDE: 600; 310; 30; 20 INJECTION, SOLUTION INTRAVENOUS at 14:30

## 2024-02-26 RX ADMIN — FENTANYL CITRATE 50 MCG: 50 INJECTION, SOLUTION INTRAMUSCULAR; INTRAVENOUS at 14:23

## 2024-02-26 RX ADMIN — INSULIN LISPRO 1 UNITS: 100 INJECTION, SOLUTION INTRAVENOUS; SUBCUTANEOUS at 17:48

## 2024-02-26 RX ADMIN — MIDAZOLAM 2 MG: 1 INJECTION INTRAMUSCULAR; INTRAVENOUS at 14:11

## 2024-02-26 RX ADMIN — PANTOPRAZOLE SODIUM 40 MG: 40 TABLET, DELAYED RELEASE ORAL at 08:53

## 2024-02-26 RX ADMIN — FENTANYL CITRATE 50 MCG: 50 INJECTION, SOLUTION INTRAMUSCULAR; INTRAVENOUS at 14:46

## 2024-02-26 RX ADMIN — OXYCODONE 5 MG: 5 TABLET ORAL at 21:29

## 2024-02-26 RX ADMIN — ONDANSETRON 4 MG: 4 TABLET, ORALLY DISINTEGRATING ORAL at 14:50

## 2024-02-26 RX ADMIN — OXYCODONE 5 MG: 5 TABLET ORAL at 11:13

## 2024-02-26 RX ADMIN — LISINOPRIL 40 MG: 20 TABLET ORAL at 08:53

## 2024-02-26 RX ADMIN — SODIUM CHLORIDE, PRESERVATIVE FREE 10 ML: 5 INJECTION INTRAVENOUS at 08:53

## 2024-02-26 RX ADMIN — METOPROLOL TARTRATE 50 MG: 50 TABLET ORAL at 21:29

## 2024-02-26 RX ADMIN — SODIUM CHLORIDE, PRESERVATIVE FREE 10 ML: 5 INJECTION INTRAVENOUS at 21:30

## 2024-02-26 RX ADMIN — INSULIN GLARGINE 10 UNITS: 100 INJECTION, SOLUTION SUBCUTANEOUS at 21:29

## 2024-02-26 RX ADMIN — PROPOFOL 200 MG: 10 INJECTION, EMULSION INTRAVENOUS at 14:23

## 2024-02-26 RX ADMIN — CEFAZOLIN 3 G: 1 INJECTION, POWDER, FOR SOLUTION INTRAMUSCULAR; INTRAVENOUS at 14:28

## 2024-02-26 RX ADMIN — OXYCODONE 5 MG: 5 TABLET ORAL at 17:45

## 2024-02-26 RX ADMIN — METOPROLOL TARTRATE 50 MG: 50 TABLET ORAL at 08:53

## 2024-02-26 RX ADMIN — PIPERACILLIN AND TAZOBACTAM 3375 MG: 3; .375 INJECTION, POWDER, FOR SOLUTION INTRAVENOUS at 08:55

## 2024-02-26 RX ADMIN — GABAPENTIN 600 MG: 600 TABLET ORAL at 21:29

## 2024-02-26 RX ADMIN — ATORVASTATIN CALCIUM 40 MG: 40 TABLET, FILM COATED ORAL at 21:30

## 2024-02-26 RX ADMIN — GABAPENTIN 600 MG: 600 TABLET ORAL at 08:53

## 2024-02-26 RX ADMIN — Medication 100 MCG: at 14:43

## 2024-02-26 RX ADMIN — DEXAMETHASONE SODIUM PHOSPHATE 8 MG: 4 INJECTION, SOLUTION INTRAMUSCULAR; INTRAVENOUS at 14:50

## 2024-02-26 ASSESSMENT — ENCOUNTER SYMPTOMS
ABDOMINAL DISTENTION: 0
APNEA: 0
EYE DISCHARGE: 0
COLOR CHANGE: 1

## 2024-02-26 ASSESSMENT — PAIN SCALES - GENERAL
PAINLEVEL_OUTOF10: 7
PAINLEVEL_OUTOF10: 5
PAINLEVEL_OUTOF10: 7
PAINLEVEL_OUTOF10: 2
PAINLEVEL_OUTOF10: 9
PAINLEVEL_OUTOF10: 4

## 2024-02-26 ASSESSMENT — PAIN DESCRIPTION - ORIENTATION
ORIENTATION: RIGHT

## 2024-02-26 ASSESSMENT — PAIN DESCRIPTION - DESCRIPTORS
DESCRIPTORS: ACHING;SHARP
DESCRIPTORS: ACHING;DISCOMFORT
DESCRIPTORS: THROBBING;DISCOMFORT
DESCRIPTORS: ACHING;THROBBING

## 2024-02-26 ASSESSMENT — PAIN DESCRIPTION - FREQUENCY: FREQUENCY: CONTINUOUS

## 2024-02-26 ASSESSMENT — PAIN DESCRIPTION - PAIN TYPE: TYPE: CHRONIC PAIN

## 2024-02-26 ASSESSMENT — PAIN DESCRIPTION - LOCATION
LOCATION: FOOT

## 2024-02-26 ASSESSMENT — PAIN - FUNCTIONAL ASSESSMENT
PAIN_FUNCTIONAL_ASSESSMENT: FACE, LEGS, ACTIVITY, CRY, AND CONSOLABILITY (FLACC)
PAIN_FUNCTIONAL_ASSESSMENT: ACTIVITIES ARE NOT PREVENTED

## 2024-02-26 ASSESSMENT — LIFESTYLE VARIABLES: SMOKING_STATUS: 1

## 2024-02-26 NOTE — ANESTHESIA POSTPROCEDURE EVALUATION
Department of Anesthesiology  Postprocedure Note    Patient: Braden Baptiste  MRN: [id number]  YOB: 1970  Date of evaluation: 2/26/2024    Procedure Summary       Date: 02/26/24 Room / Location: [address]    Anesthesia Start: 1411 Anesthesia Stop: 1505    Procedure: *ADD ON, WANTS TF* FOOT DEBRIDEMENT INCISION AND DRAINAGE WITH BONE BIOPSIES (Right) Diagnosis:       Diabetic ulcer of right foot associated with type 1 diabetes mellitus, unspecified part of foot, unspecified ulcer stage (HCC)      (Diabetic ulcer of right foot associated with type 1 diabetes mellitus, unspecified part of foot, unspecified ulcer stage (HCC) [E10.621, L97.519])    Surgeons: Marline Johnson DPM Responsible Provider: Braulio Henao MD    Anesthesia Type: general ASA Status: 3            Anesthesia Type: No value filed.    Alejandra Phase I:      Alejandra Phase II:      Anesthesia Post Evaluation    Patient location during evaluation: PACU  Patient participation: complete - patient participated  Level of consciousness: awake  Pain score: 1  Airway patency: patent  Nausea & Vomiting: no nausea and no vomiting  Cardiovascular status: blood pressure returned to baseline and hemodynamically stable  Respiratory status: acceptable  Hydration status: euvolemic  Pain management: adequate    No notable events documented.

## 2024-02-26 NOTE — PLAN OF CARE
Problem: Discharge Planning  Goal: Discharge to home or other facility with appropriate resources  2/26/2024 1803 by Trina Vergara RN  Outcome: Progressing  2/26/2024 1705 by Trina Vergara RN  Outcome: Progressing     Problem: Pain  Goal: Verbalizes/displays adequate comfort level or baseline comfort level  2/26/2024 1803 by Trina Vergara RN  Outcome: Progressing  2/26/2024 1705 by Trina Vergara RN  Outcome: Progressing     Problem: Safety - Adult  Goal: Free from fall injury  2/26/2024 1803 by Trina Vergara RN  Outcome: Progressing  2/26/2024 1705 by Trina Vergara RN  Outcome: Progressing     Problem: Chronic Conditions and Co-morbidities  Goal: Patient's chronic conditions and co-morbidity symptoms are monitored and maintained or improved  2/26/2024 1803 by Trina eVrgara RN  Outcome: Progressing  2/26/2024 1705 by Trina Vergara RN  Outcome: Progressing     Problem: ABCDS Injury Assessment  Goal: Absence of physical injury  2/26/2024 1803 by Trina Vergara RN  Outcome: Progressing  2/26/2024 1705 by Trina Vergara RN  Outcome: Progressing

## 2024-02-26 NOTE — ANESTHESIA PRE PROCEDURE
FUSION ANTERIOR - MEDTRONICS performed by Ulices Escudero MD at [facility] OR    KNEE ARTHROSCOPY Bilateral     SHOULDER ARTHROPLASTY Bilateral     THROAT SURGERY  2020       Social History:    Social History     Tobacco Use    Smoking status: Every Day     Types: Cigarettes    Smokeless tobacco: Never   Substance Use Topics    Alcohol use: Yes     Comment: occcasionally                                 Ready to quit: Not Answered  Counseling given: Not Answered      Vital Signs (Current):   Vitals:    02/25/24 2112 02/26/24 0708 02/26/24 1143 02/26/24 1253   BP:  135/67  (!) 160/70   Pulse:  84  79   Resp:  16 16 16   Temp: 98.6 °F (37 °C) 98 °F (36.7 °C)  98 °F (36.7 °C)   TempSrc: Oral Oral  Temporal   SpO2:  96%  96%   Weight:       Height:                                                  BP Readings from Last 3 Encounters:   02/26/24 (!) 160/70   11/09/22 131/80   02/23/21 136/63       NPO Status: Time of last liquid consumption: 1700                        Time of last solid consumption: 1700                        Date of last liquid consumption: 02/25/24                        Date of last solid food consumption: 02/25/24    BMI:   Wt Readings from Last 3 Encounters:   02/23/24 121.3 kg (267 lb 6.7 oz)   02/24/24 122 kg (268 lb 15.4 oz)   11/09/22 117.9 kg (260 lb)     Body mass index is 35.28 kg/m².    CBC:   Lab Results   Component Value Date/Time    WBC 10.4 02/26/2024 06:39 AM    RBC 4.37 02/26/2024 06:39 AM    HGB 12.0 02/26/2024 06:39 AM    HCT 38.7 02/26/2024 06:39 AM    MCV 88.6 02/26/2024 06:39 AM    RDW 12.8 02/26/2024 06:39 AM     02/26/2024 06:39 AM       CMP:   Lab Results   Component Value Date/Time     02/25/2024 05:50 AM    K 3.8 02/25/2024 05:50 AM     02/25/2024 05:50 AM    CO2 22 02/25/2024 05:50 AM    BUN 10 02/25/2024 05:50 AM    CREATININE 0.8 02/25/2024 05:50 AM    GFRAA >60 02/23/2021 06:12 AM    LABGLOM >60 02/25/2024 05:50 AM    GLUCOSE 181 02/25/2024 05:50 AM    CALCIUM

## 2024-02-26 NOTE — PLAN OF CARE
Plan of care note  Foot and Ankle Surgery         Patient was taken to the OR today, 2/26/2024, for the following surgical procedure:  -Incision and drainage down the level bone, right foot  -Deep trocar bone biopsies x 2, right foot    The patient's incision was left open and packed to allow for adequate drainage of the patient's infection.  Podiatry to perform bedside dressing changes with washouts and monitor the patient's labs/clinical picture.  Will also follow intraoperative bone biopsies and tissue cultures.  After an adequate amount of time is passed and final cultures are obtained, patient will be taken to the OR for stage II of our planned surgical procedure.     Mahesh Mccain DPM   Foot and Ankle Surgery   2/26/2024 at 3:07 PM

## 2024-02-26 NOTE — BRIEF OP NOTE
PODIATRY BRIEF OP NOTE    PATIENT NAME: Braden Baptiste  YOB: 1970  -  53 y.o. male  MRN: [id number]  DATE: 2/26/2024  BILLING #: [id number]    Surgeon(s):  Marline Johnson DPM     ASSISTANTS: Mahesh Mccain DPM PGY 2    PRE-OP DIAGNOSIS:   Diabetic ulceration down to the level of bone, right foot  Diabetic foot infection/abscess, right foot  Right foot cellulitis  Type 2 diabetes with peripheral neuropathy    POST-OP DIAGNOSIS: Same as above.    PROCEDURE:   Incision and drainage down to the level bone, right foot  Deep trocar bone biopsies x 2, right foot    ANESTHESIA: General with local    HEMOSTASIS: P pressure    ESTIMATED BLOOD LOSS: Less than 50 cc.    MATERIALS:   * No implants in log *    INJECTABLES: 20 cc of 1% lidocaine plain    SPECIMEN:   ID Type Source Tests Collected by Time Destination   1 : RIGHT FOOT TISSUE Tissue Foot CULTURE, FUNGUS, CULTURE, TISSUE, CULTURE, ANAEROBIC AND AEROBIC Marline Johnson DPM 2/26/2024 1442    A : 4TH METATARSAL BONE (RIGHT) Bone Transmetatarsal SURGICAL PATHOLOGY, CULTURE, FUNGUS, CULTURE, ANAEROBIC AND AEROBIC Marline Johnson DPM 2/26/2024 1444    B : 5TH METATARSAL BONE (RIGHT) Bone Transmetatarsal SURGICAL PATHOLOGY, CULTURE, FUNGUS, FUNGAL STAIN, CULTURE, ANAEROBIC AND AEROBIC Marline Johnson DPM 2/26/2024 1445        COMPLICATIONS: None    FINDINGS: Incision and drainage performed within the fourth webspace.  Purulent drainage drained.  Area flushed via saline and pulse lavage.  Bone biopsies of the fourth and fifth metatarsal taken.  Area packed and left open to allow for drainage    Mahesh Mccain DPM   Podiatric Medicine & Surgery   2/26/2024 at 3:05 PM

## 2024-02-26 NOTE — FLOWSHEET NOTE
Patient has been refusing to allow nursing staff to check his blood sugars with accucheck.    Patient has device on his arm to monitor his blood sugars.  Shirley Waterhouse NP notified of patient refusal to allow blood sugars to be taken.  Order received from Shirley Waterhouse NP.  Also notified that patient has surgery planned for Monday.  Instructed to hold Lovenox and it was ok for patient to receive Lantus insulin tonight.

## 2024-02-26 NOTE — PLAN OF CARE
Problem: Discharge Planning  Goal: Discharge to home or other facility with appropriate resources  Outcome: Progressing     Problem: Pain  Goal: Verbalizes/displays adequate comfort level or baseline comfort level  Outcome: Progressing     Problem: Safety - Adult  Goal: Free from fall injury  Outcome: Progressing     Problem: Chronic Conditions and Co-morbidities  Goal: Patient's chronic conditions and co-morbidity symptoms are monitored and maintained or improved  Outcome: Progressing     Problem: ABCDS Injury Assessment  Goal: Absence of physical injury  Outcome: Progressing

## 2024-02-26 NOTE — FLOWSHEET NOTE
Patient instructed on NPO status after Midnight and need for him to wash with Hibiclens tonight and prior to OR on Monday.  Patient verbalizes understanding.

## 2024-02-27 LAB
BASOPHILS # BLD: 0.05 K/UL (ref 0–0.2)
BASOPHILS NFR BLD: 0 % (ref 0–2)
CRP SERPL HS-MCNC: 105.9 MG/L (ref 0–5)
EOSINOPHIL # BLD: 0.07 K/UL (ref 0–0.44)
EOSINOPHILS RELATIVE PERCENT: 1 % (ref 1–4)
ERYTHROCYTE [DISTWIDTH] IN BLOOD BY AUTOMATED COUNT: 12.7 % (ref 11.8–14.4)
ERYTHROCYTE [SEDIMENTATION RATE] IN BLOOD BY PHOTOMETRIC METHOD: 73 MM/HR (ref 0–20)
HCT VFR BLD AUTO: 40.8 % (ref 40.7–50.3)
HGB BLD-MCNC: 12.8 G/DL (ref 13–17)
IMM GRANULOCYTES # BLD AUTO: 0.05 K/UL (ref 0–0.3)
IMM GRANULOCYTES NFR BLD: 0 %
LYMPHOCYTES NFR BLD: 1.52 K/UL (ref 1.1–3.7)
LYMPHOCYTES RELATIVE PERCENT: 13 % (ref 24–43)
MCH RBC QN AUTO: 27.9 PG (ref 25.2–33.5)
MCHC RBC AUTO-ENTMCNC: 31.4 G/DL (ref 28.4–34.8)
MCV RBC AUTO: 88.9 FL (ref 82.6–102.9)
MICROORGANISM SPEC CULT: NORMAL
MICROORGANISM/AGENT SPEC: NORMAL
MONOCYTES NFR BLD: 0.76 K/UL (ref 0.1–1.2)
MONOCYTES NFR BLD: 6 % (ref 3–12)
NEUTROPHILS NFR BLD: 80 % (ref 36–65)
NEUTS SEG NFR BLD: 9.69 K/UL (ref 1.5–8.1)
NRBC BLD-RTO: 0 PER 100 WBC
PLATELET # BLD AUTO: 274 K/UL (ref 138–453)
PMV BLD AUTO: 11.1 FL (ref 8.1–13.5)
RBC # BLD AUTO: 4.59 M/UL (ref 4.21–5.77)
SPECIMEN DESCRIPTION: NORMAL
WBC OTHER # BLD: 12.1 K/UL (ref 3.5–11.3)

## 2024-02-27 PROCEDURE — 6370000000 HC RX 637 (ALT 250 FOR IP)

## 2024-02-27 PROCEDURE — 85652 RBC SED RATE AUTOMATED: CPT

## 2024-02-27 PROCEDURE — 85025 COMPLETE CBC W/AUTO DIFF WBC: CPT

## 2024-02-27 PROCEDURE — 2580000003 HC RX 258

## 2024-02-27 PROCEDURE — 86140 C-REACTIVE PROTEIN: CPT

## 2024-02-27 PROCEDURE — 6360000002 HC RX W HCPCS

## 2024-02-27 PROCEDURE — 99232 SBSQ HOSP IP/OBS MODERATE 35: CPT | Performed by: INTERNAL MEDICINE

## 2024-02-27 PROCEDURE — 36415 COLL VENOUS BLD VENIPUNCTURE: CPT

## 2024-02-27 PROCEDURE — 6370000000 HC RX 637 (ALT 250 FOR IP): Performed by: INTERNAL MEDICINE

## 2024-02-27 PROCEDURE — 1200000000 HC SEMI PRIVATE

## 2024-02-27 RX ORDER — INSULIN GLARGINE 100 [IU]/ML
15 INJECTION, SOLUTION SUBCUTANEOUS 2 TIMES DAILY
Status: DISCONTINUED | OUTPATIENT
Start: 2024-02-27 | End: 2024-02-29

## 2024-02-27 RX ADMIN — GABAPENTIN 600 MG: 600 TABLET ORAL at 21:30

## 2024-02-27 RX ADMIN — INSULIN LISPRO 4 UNITS: 100 INJECTION, SOLUTION INTRAVENOUS; SUBCUTANEOUS at 12:12

## 2024-02-27 RX ADMIN — GABAPENTIN 600 MG: 600 TABLET ORAL at 14:04

## 2024-02-27 RX ADMIN — METOPROLOL TARTRATE 50 MG: 50 TABLET ORAL at 09:49

## 2024-02-27 RX ADMIN — PIPERACILLIN AND TAZOBACTAM 3375 MG: 3; .375 INJECTION, POWDER, FOR SOLUTION INTRAVENOUS at 00:29

## 2024-02-27 RX ADMIN — OXYCODONE 5 MG: 5 TABLET ORAL at 18:41

## 2024-02-27 RX ADMIN — PANTOPRAZOLE SODIUM 40 MG: 40 TABLET, DELAYED RELEASE ORAL at 06:14

## 2024-02-27 RX ADMIN — INSULIN GLARGINE 15 UNITS: 100 INJECTION, SOLUTION SUBCUTANEOUS at 21:34

## 2024-02-27 RX ADMIN — ATORVASTATIN CALCIUM 40 MG: 40 TABLET, FILM COATED ORAL at 21:30

## 2024-02-27 RX ADMIN — PIPERACILLIN AND TAZOBACTAM 3375 MG: 3; .375 INJECTION, POWDER, FOR SOLUTION INTRAVENOUS at 21:43

## 2024-02-27 RX ADMIN — INSULIN GLARGINE 15 UNITS: 100 INJECTION, SOLUTION SUBCUTANEOUS at 12:12

## 2024-02-27 RX ADMIN — SODIUM CHLORIDE, PRESERVATIVE FREE 10 ML: 5 INJECTION INTRAVENOUS at 21:31

## 2024-02-27 RX ADMIN — INSULIN LISPRO 1 UNITS: 100 INJECTION, SOLUTION INTRAVENOUS; SUBCUTANEOUS at 17:25

## 2024-02-27 RX ADMIN — OXYCODONE 5 MG: 5 TABLET ORAL at 14:00

## 2024-02-27 RX ADMIN — LISINOPRIL 40 MG: 20 TABLET ORAL at 09:48

## 2024-02-27 RX ADMIN — GABAPENTIN 600 MG: 600 TABLET ORAL at 09:48

## 2024-02-27 RX ADMIN — SODIUM CHLORIDE, PRESERVATIVE FREE 10 ML: 5 INJECTION INTRAVENOUS at 09:50

## 2024-02-27 RX ADMIN — PIPERACILLIN AND TAZOBACTAM 3375 MG: 3; .375 INJECTION, POWDER, FOR SOLUTION INTRAVENOUS at 10:13

## 2024-02-27 RX ADMIN — OXYCODONE 5 MG: 5 TABLET ORAL at 09:53

## 2024-02-27 RX ADMIN — ASPIRIN 81 MG: 81 TABLET, COATED ORAL at 09:47

## 2024-02-27 RX ADMIN — OXYCODONE 5 MG: 5 TABLET ORAL at 22:49

## 2024-02-27 RX ADMIN — METOPROLOL TARTRATE 50 MG: 50 TABLET ORAL at 21:30

## 2024-02-27 ASSESSMENT — PAIN SCALES - GENERAL
PAINLEVEL_OUTOF10: 4
PAINLEVEL_OUTOF10: 8
PAINLEVEL_OUTOF10: 3
PAINLEVEL_OUTOF10: 8
PAINLEVEL_OUTOF10: 9
PAINLEVEL_OUTOF10: 5
PAINLEVEL_OUTOF10: 8
PAINLEVEL_OUTOF10: 8
PAINLEVEL_OUTOF10: 5

## 2024-02-27 ASSESSMENT — PAIN DESCRIPTION - DESCRIPTORS
DESCRIPTORS: ACHING;SORE
DESCRIPTORS: DISCOMFORT;ACHING
DESCRIPTORS: ACHING;SORE

## 2024-02-27 ASSESSMENT — PAIN DESCRIPTION - LOCATION
LOCATION: FOOT

## 2024-02-27 ASSESSMENT — PAIN DESCRIPTION - ORIENTATION
ORIENTATION: RIGHT

## 2024-02-27 ASSESSMENT — ENCOUNTER SYMPTOMS
EYE REDNESS: 0
COLOR CHANGE: 1
EYE PAIN: 0
ABDOMINAL DISTENTION: 0
EYE DISCHARGE: 0
APNEA: 0

## 2024-02-27 NOTE — OP NOTE
drainage was expressed and normal bleeding was appreciated. Throughout the procedure roughly 5 cc of purulent drainage was expressed.     A stab incision was made using a 15 blade at the dorsal aspect of the fifth metatarsal neck.  Hemostats were inserted into the stab incision to separate tissue planes and allow for clean biopsy with a Jamshidi needle.  Once the hemostat was in contact the bone they were removed and a Jamshidi needle was inserted at the fifth metatarsal.  Two separate bone biopsies were taken and placed in separate specimen jars.  One was sent for micro, and the other was sent for path.       A stab incision was made using a 15 blade at the dorsal aspect of the fourth metatarsal neck.  Hemostats were inserted into the stab incision to separate tissue planes and allow for clean biopsy with a Jamshidi needle.  Once the hemostat was in contact the bone they were removed and a Jamshidi needle was inserted at the with metatarsal.  Two separate bone biopsies were taken and placed in separate specimen jars.  One was sent for micro, and the other was sent for path.       Dressings consisting of 1/2\" Iodoform packing, adaptic, 4 x 4s, Kerlix and an Ace bandage were applied. The patient tolerated the above procedure and anesthesia well without complications. The patient was transported from the operating room to the PACU with vital signs stable and vascular status intact to the right foot. Patient was then transferred back to the floor.     Mahesh Mccain DPM   Podiatric Medicine & Surgery   2/26/2024 at 3:05 PM

## 2024-02-27 NOTE — PLAN OF CARE
Problem: Discharge Planning  Goal: Discharge to home or other facility with appropriate resources  2/27/2024 0309 by Judy Calvo RN  Outcome: Progressing  2/26/2024 1803 by Trina Vergara RN  Outcome: Progressing  2/26/2024 1705 by Trina Vergara RN  Outcome: Progressing     Problem: Pain  Goal: Verbalizes/displays adequate comfort level or baseline comfort level  2/27/2024 0309 by Judy Calvo RN  Outcome: Progressing  2/26/2024 1803 by Trina Vergara RN  Outcome: Progressing  2/26/2024 1705 by Trina Vergara RN  Outcome: Progressing     Problem: Safety - Adult  Goal: Free from fall injury  2/27/2024 0309 by Judy Calvo RN  Outcome: Progressing  2/26/2024 1803 by Trina Vergara RN  Outcome: Progressing  2/26/2024 1705 by Trina Vergara RN  Outcome: Progressing     Problem: Chronic Conditions and Co-morbidities  Goal: Patient's chronic conditions and co-morbidity symptoms are monitored and maintained or improved  2/27/2024 0309 by Judy Calvo RN  Outcome: Progressing  2/26/2024 1803 by Trina Vergara RN  Outcome: Progressing  2/26/2024 1705 by Trina Vergara RN  Outcome: Progressing     Problem: ABCDS Injury Assessment  Goal: Absence of physical injury  2/27/2024 0309 by Judy Calvo RN  Outcome: Progressing  2/26/2024 1803 by Trina Vergara RN  Outcome: Progressing  2/26/2024 1705 by Trina Vergara RN  Outcome: Progressing

## 2024-02-27 NOTE — PLAN OF CARE
Problem: Discharge Planning  Goal: Discharge to home or other facility with appropriate resources  2/27/2024 0632 by Kimmy Cross RN  Outcome: Progressing  2/27/2024 0309 by Judy Calvo RN  Outcome: Progressing  2/26/2024 1803 by Trina Vergara RN  Outcome: Progressing  2/26/2024 1705 by Trina Vergara RN  Outcome: Progressing     Problem: Pain  Goal: Verbalizes/displays adequate comfort level or baseline comfort level  2/27/2024 0632 by Kimmy Cross RN  Outcome: Progressing  2/27/2024 0309 by Judy Calvo RN  Outcome: Progressing  2/26/2024 1803 by Trina Vergara RN  Outcome: Progressing  2/26/2024 1705 by Trina Vergara RN  Outcome: Progressing     Problem: Safety - Adult  Goal: Free from fall injury  2/27/2024 0632 by Kimmy Cross RN  Outcome: Progressing  2/27/2024 0309 by Judy Calvo RN  Outcome: Progressing  2/26/2024 1803 by Trina Vergara RN  Outcome: Progressing  2/26/2024 1705 by Trina Vergara RN  Outcome: Progressing     Problem: Chronic Conditions and Co-morbidities  Goal: Patient's chronic conditions and co-morbidity symptoms are monitored and maintained or improved  2/27/2024 0632 by Kimmy Cross RN  Outcome: Progressing  2/27/2024 0309 by Judy Calvo RN  Outcome: Progressing  2/26/2024 1803 by Trina Vergara RN  Outcome: Progressing  2/26/2024 1705 by Trina Vergara RN  Outcome: Progressing     Problem: ABCDS Injury Assessment  Goal: Absence of physical injury  2/27/2024 0632 by Kimmy Cross RN  Outcome: Progressing  2/27/2024 0309 by Judy Calvo RN  Outcome: Progressing  2/26/2024 1803 by Trina Vergara RN  Outcome: Progressing  2/26/2024 1705 by Trina Vergara RN  Outcome: Progressing

## 2024-02-27 NOTE — PLAN OF CARE
Problem: Discharge Planning  Goal: Discharge to home or other facility with appropriate resources  2/27/2024 1529 by Trina Vergara RN  Outcome: Progressing  2/27/2024 0632 by Kimmy Cross RN  Outcome: Progressing  2/27/2024 0309 by Judy Calvo RN  Outcome: Progressing     Problem: Pain  Goal: Verbalizes/displays adequate comfort level or baseline comfort level  2/27/2024 1529 by Trina Vergara RN  Outcome: Progressing  2/27/2024 0632 by Kimmy Cross RN  Outcome: Progressing  2/27/2024 0309 by Judy Calvo RN  Outcome: Progressing     Problem: Safety - Adult  Goal: Free from fall injury  2/27/2024 1529 by Trina Vergara RN  Outcome: Progressing  2/27/2024 0632 by Kimmy Cross RN  Outcome: Progressing  2/27/2024 0309 by Judy Calvo RN  Outcome: Progressing     Problem: Chronic Conditions and Co-morbidities  Goal: Patient's chronic conditions and co-morbidity symptoms are monitored and maintained or improved  2/27/2024 1529 by Trina Vergara RN  Outcome: Progressing  2/27/2024 0632 by Kimmy Cross RN  Outcome: Progressing  2/27/2024 0309 by Judy Calvo RN  Outcome: Progressing     Problem: ABCDS Injury Assessment  Goal: Absence of physical injury  2/27/2024 1529 by Trina Vergara RN  Outcome: Progressing  2/27/2024 0632 by Kimmy Cross RN  Outcome: Progressing  2/27/2024 0309 by Judy Calvo RN  Outcome: Progressing

## 2024-02-28 LAB
ANION GAP SERPL CALCULATED.3IONS-SCNC: 10 MMOL/L (ref 9–17)
BASOPHILS # BLD: 0.16 K/UL (ref 0–0.2)
BASOPHILS NFR BLD: 2 % (ref 0–2)
BUN SERPL-MCNC: 15 MG/DL (ref 6–20)
CALCIUM SERPL-MCNC: 8.9 MG/DL (ref 8.6–10.4)
CHLORIDE SERPL-SCNC: 103 MMOL/L (ref 98–107)
CO2 SERPL-SCNC: 24 MMOL/L (ref 20–31)
CREAT SERPL-MCNC: 0.9 MG/DL (ref 0.7–1.2)
EOSINOPHIL # BLD: 0.46 K/UL (ref 0–0.44)
EOSINOPHILS RELATIVE PERCENT: 5 % (ref 1–4)
ERYTHROCYTE [DISTWIDTH] IN BLOOD BY AUTOMATED COUNT: 12.8 % (ref 11.8–14.4)
GFR SERPL CREATININE-BSD FRML MDRD: >60 ML/MIN/1.73M2
GLUCOSE SERPL-MCNC: 137 MG/DL (ref 70–99)
HCT VFR BLD AUTO: 41.8 % (ref 40.7–50.3)
HGB BLD-MCNC: 12.7 G/DL (ref 13–17)
IMM GRANULOCYTES # BLD AUTO: 0.04 K/UL (ref 0–0.3)
IMM GRANULOCYTES NFR BLD: 0 %
LYMPHOCYTES NFR BLD: 2.45 K/UL (ref 1.1–3.7)
LYMPHOCYTES RELATIVE PERCENT: 25 % (ref 24–43)
MCH RBC QN AUTO: 27.7 PG (ref 25.2–33.5)
MCHC RBC AUTO-ENTMCNC: 30.4 G/DL (ref 28.4–34.8)
MCV RBC AUTO: 91.3 FL (ref 82.6–102.9)
MONOCYTES NFR BLD: 0.66 K/UL (ref 0.1–1.2)
MONOCYTES NFR BLD: 7 % (ref 3–12)
NEUTROPHILS NFR BLD: 61 % (ref 36–65)
NEUTS SEG NFR BLD: 6.11 K/UL (ref 1.5–8.1)
NRBC BLD-RTO: 0 PER 100 WBC
PLATELET # BLD AUTO: 270 K/UL (ref 138–453)
PMV BLD AUTO: 10.7 FL (ref 8.1–13.5)
POTASSIUM SERPL-SCNC: 4.1 MMOL/L (ref 3.7–5.3)
RBC # BLD AUTO: 4.58 M/UL (ref 4.21–5.77)
SODIUM SERPL-SCNC: 137 MMOL/L (ref 135–144)
WBC OTHER # BLD: 9.9 K/UL (ref 3.5–11.3)

## 2024-02-28 PROCEDURE — 6370000000 HC RX 637 (ALT 250 FOR IP)

## 2024-02-28 PROCEDURE — 36569 INSJ PICC 5 YR+ W/O IMAGING: CPT

## 2024-02-28 PROCEDURE — 80048 BASIC METABOLIC PNL TOTAL CA: CPT

## 2024-02-28 PROCEDURE — C1751 CATH, INF, PER/CENT/MIDLINE: HCPCS

## 2024-02-28 PROCEDURE — 02HV33Z INSERTION OF INFUSION DEVICE INTO SUPERIOR VENA CAVA, PERCUTANEOUS APPROACH: ICD-10-PCS | Performed by: INTERNAL MEDICINE

## 2024-02-28 PROCEDURE — 85025 COMPLETE CBC W/AUTO DIFF WBC: CPT

## 2024-02-28 PROCEDURE — 1200000000 HC SEMI PRIVATE

## 2024-02-28 PROCEDURE — 6370000000 HC RX 637 (ALT 250 FOR IP): Performed by: INTERNAL MEDICINE

## 2024-02-28 PROCEDURE — B548ZZA ULTRASONOGRAPHY OF SUPERIOR VENA CAVA, GUIDANCE: ICD-10-PCS | Performed by: INTERNAL MEDICINE

## 2024-02-28 PROCEDURE — 76937 US GUIDE VASCULAR ACCESS: CPT

## 2024-02-28 PROCEDURE — 2580000003 HC RX 258

## 2024-02-28 PROCEDURE — 36415 COLL VENOUS BLD VENIPUNCTURE: CPT

## 2024-02-28 PROCEDURE — 99232 SBSQ HOSP IP/OBS MODERATE 35: CPT | Performed by: INTERNAL MEDICINE

## 2024-02-28 PROCEDURE — 6360000002 HC RX W HCPCS

## 2024-02-28 RX ADMIN — PIPERACILLIN AND TAZOBACTAM 3375 MG: 3; .375 INJECTION, POWDER, FOR SOLUTION INTRAVENOUS at 21:29

## 2024-02-28 RX ADMIN — GABAPENTIN 600 MG: 600 TABLET ORAL at 21:20

## 2024-02-28 RX ADMIN — GABAPENTIN 600 MG: 600 TABLET ORAL at 13:40

## 2024-02-28 RX ADMIN — OXYCODONE 5 MG: 5 TABLET ORAL at 18:28

## 2024-02-28 RX ADMIN — SODIUM CHLORIDE, PRESERVATIVE FREE 10 ML: 5 INJECTION INTRAVENOUS at 08:16

## 2024-02-28 RX ADMIN — PIPERACILLIN AND TAZOBACTAM 3375 MG: 3; .375 INJECTION, POWDER, FOR SOLUTION INTRAVENOUS at 05:30

## 2024-02-28 RX ADMIN — SODIUM CHLORIDE, PRESERVATIVE FREE 10 ML: 5 INJECTION INTRAVENOUS at 21:20

## 2024-02-28 RX ADMIN — GABAPENTIN 600 MG: 600 TABLET ORAL at 08:15

## 2024-02-28 RX ADMIN — OXYCODONE 5 MG: 5 TABLET ORAL at 13:40

## 2024-02-28 RX ADMIN — INSULIN GLARGINE 15 UNITS: 100 INJECTION, SOLUTION SUBCUTANEOUS at 21:20

## 2024-02-28 RX ADMIN — OXYCODONE 5 MG: 5 TABLET ORAL at 22:32

## 2024-02-28 RX ADMIN — ASPIRIN 81 MG: 81 TABLET, COATED ORAL at 08:16

## 2024-02-28 RX ADMIN — ATORVASTATIN CALCIUM 40 MG: 40 TABLET, FILM COATED ORAL at 21:20

## 2024-02-28 RX ADMIN — INSULIN GLARGINE 15 UNITS: 100 INJECTION, SOLUTION SUBCUTANEOUS at 09:39

## 2024-02-28 RX ADMIN — METOPROLOL TARTRATE 50 MG: 50 TABLET ORAL at 21:20

## 2024-02-28 RX ADMIN — INSULIN LISPRO 1 UNITS: 100 INJECTION, SOLUTION INTRAVENOUS; SUBCUTANEOUS at 17:09

## 2024-02-28 RX ADMIN — PIPERACILLIN AND TAZOBACTAM 3375 MG: 3; .375 INJECTION, POWDER, FOR SOLUTION INTRAVENOUS at 12:58

## 2024-02-28 RX ADMIN — METOPROLOL TARTRATE 50 MG: 50 TABLET ORAL at 08:15

## 2024-02-28 RX ADMIN — LISINOPRIL 40 MG: 20 TABLET ORAL at 08:15

## 2024-02-28 RX ADMIN — PANTOPRAZOLE SODIUM 40 MG: 40 TABLET, DELAYED RELEASE ORAL at 05:35

## 2024-02-28 ASSESSMENT — PAIN DESCRIPTION - ORIENTATION
ORIENTATION: RIGHT

## 2024-02-28 ASSESSMENT — ENCOUNTER SYMPTOMS
APNEA: 0
EYE REDNESS: 0
EYE PAIN: 0
ABDOMINAL DISTENTION: 0
EYE DISCHARGE: 0
COLOR CHANGE: 1

## 2024-02-28 ASSESSMENT — PAIN DESCRIPTION - LOCATION
LOCATION: FOOT
LOCATION: HEAD
LOCATION: FOOT

## 2024-02-28 ASSESSMENT — PAIN SCALES - GENERAL
PAINLEVEL_OUTOF10: 5
PAINLEVEL_OUTOF10: 4
PAINLEVEL_OUTOF10: 8
PAINLEVEL_OUTOF10: 7
PAINLEVEL_OUTOF10: 5
PAINLEVEL_OUTOF10: 8
PAINLEVEL_OUTOF10: 7

## 2024-02-28 ASSESSMENT — PAIN DESCRIPTION - DESCRIPTORS
DESCRIPTORS: ACHING
DESCRIPTORS: ACHING;DISCOMFORT

## 2024-02-28 NOTE — PLAN OF CARE
Problem: Discharge Planning  Goal: Discharge to home or other facility with appropriate resources  2/28/2024 0621 by Kristin Talley RN  Outcome: Progressing  2/27/2024 2315 by Judy Calvo RN  Outcome: Progressing     Problem: Pain  Goal: Verbalizes/displays adequate comfort level or baseline comfort level  2/28/2024 0621 by Kristin Talley RN  Outcome: Progressing  2/27/2024 2315 by Judy Calvo RN  Outcome: Progressing     Problem: Safety - Adult  Goal: Free from fall injury  2/28/2024 0621 by Kristin Talley RN  Outcome: Progressing  2/27/2024 2315 by Judy Calvo RN  Outcome: Progressing     Problem: Chronic Conditions and Co-morbidities  Goal: Patient's chronic conditions and co-morbidity symptoms are monitored and maintained or improved  2/28/2024 0621 by Kristin Talley RN  Outcome: Progressing  2/27/2024 2315 by Judy Calvo RN  Outcome: Progressing     Problem: ABCDS Injury Assessment  Goal: Absence of physical injury  2/28/2024 0621 by Kristin Talley RN  Outcome: Progressing  2/27/2024 2315 by Judy Calvo RN  Outcome: Progressing

## 2024-02-28 NOTE — CARE COORDINATION
Transitional planning: Met w/ patient about discharge plan and still plans to return home independent. States mother can transport home.

## 2024-02-28 NOTE — PROCEDURES
History/labs/allergies reviewed  PICC placed for IV rocephin x6 weeks    Benefits include stable long term intravenous access.    Risks include failure to obtain the desired result(s) of the procedure, discomfort, injury, the need for additional therapies, permanent loss of body function, bleeding from the site, arterial puncture, air embolism, nerve damage, hematoma, phlebitis, catheter fracture/rupture, catheter embolism, catheter occlusion, catheter migration, catheter site infection, unintentional/accidental removal of catheter, bloodstream infection, infiltration, cardiac arrhythmia, vein thrombosis, difficult catheter removal, pleural effusion, pericardial effusion/cardiac tamponade, and death.  Alternatives discussed including centrally inserted central catheter, as well as less invasive procedures such as multiple peripheral IVs, extended dwell catheters and midline placement    Consent signed and obtained by proceduralist   Placed by BALDEMAR CLEMENTS  Assisted by  CALIN CLEMENTS  Time out performed using two identifiers  Catheter type single lumen picc  Product type Arrow AMAT 4.5 Fr single lumen PICC w/ navicurve and vps rhythm  Lot # [id number]  Expiration date 3/31/2024  Catheter size 4.5 Persian  Trimmed at 41 cm  Total length inserted 41 cm  External catheter length 0 cm  Location right brachial vein 0.72 cm CVR 4.3%  Number of attempts 1  Estimated blood loss 1 ml  Pre procedure cardiac Rhythm normal sinus rhythm per bedside telemetry and vps rhythm  Placement verified by- VPS rhythm Max P wave noted by amplitude changes of the P wave, positive blood return, flushes easily  Special equipment used- ultrasound, micro-introducer technique and vps rhythm  Catheter secured with 3M securement device/secureport IV liquid adhesive applied at insertion site  Dressing applied- Tegaderm CHG  Lidocaine administered intradermally conc. 1% 2 mL    PICC line education:   [ x ] Discussed with patient prior to signing

## 2024-02-28 NOTE — PLAN OF CARE
Problem: Discharge Planning  Goal: Discharge to home or other facility with appropriate resources  2/27/2024 2315 by Judy Calvo RN  Outcome: Progressing  2/27/2024 1529 by Trina Vergara RN  Outcome: Progressing     Problem: Pain  Goal: Verbalizes/displays adequate comfort level or baseline comfort level  2/27/2024 2315 by Judy Calvo RN  Outcome: Progressing  2/27/2024 1529 by Trina Vergara RN  Outcome: Progressing     Problem: Safety - Adult  Goal: Free from fall injury  2/27/2024 2315 by Judy Calvo RN  Outcome: Progressing  2/27/2024 1529 by Trina Vergara RN  Outcome: Progressing     Problem: Chronic Conditions and Co-morbidities  Goal: Patient's chronic conditions and co-morbidity symptoms are monitored and maintained or improved  2/27/2024 2315 by Judy Calvo RN  Outcome: Progressing  2/27/2024 1529 by Trina Vergara RN  Outcome: Progressing     Problem: ABCDS Injury Assessment  Goal: Absence of physical injury  2/27/2024 2315 by Judy Calvo RN  Outcome: Progressing  2/27/2024 1529 by Trina Vergara RN  Outcome: Progressing

## 2024-02-28 NOTE — PLAN OF CARE
Problem: Pain  Goal: Verbalizes/displays adequate comfort level or baseline comfort level  2/28/2024 1731 by Katia Ceron RN  Outcome: Progressing     Problem: Safety - Adult  Goal: Free from fall injury  2/28/2024 1731 by Katia Ceron RN  Outcome: Progressing     Problem: ABCDS Injury Assessment  Goal: Absence of physical injury  2/28/2024 1731 by Katia Ceron RN  Outcome: Progressing

## 2024-02-29 VITALS
HEIGHT: 73 IN | RESPIRATION RATE: 16 BRPM | BODY MASS INDEX: 35.52 KG/M2 | DIASTOLIC BLOOD PRESSURE: 78 MMHG | TEMPERATURE: 98.1 F | HEART RATE: 72 BPM | OXYGEN SATURATION: 96 % | SYSTOLIC BLOOD PRESSURE: 151 MMHG | WEIGHT: 268 LBS

## 2024-02-29 PROBLEM — R79.82 CRP ELEVATED: Status: RESOLVED | Noted: 2024-02-26 | Resolved: 2024-02-29

## 2024-02-29 PROBLEM — R70.0 ESR RAISED: Status: RESOLVED | Noted: 2024-02-26 | Resolved: 2024-02-29

## 2024-02-29 LAB
BASOPHILS # BLD: 0.15 K/UL (ref 0–0.2)
BASOPHILS NFR BLD: 2 % (ref 0–2)
CRP SERPL HS-MCNC: 33.7 MG/L (ref 0–5)
EOSINOPHIL # BLD: 0.44 K/UL (ref 0–0.44)
ERYTHROCYTE [DISTWIDTH] IN BLOOD BY AUTOMATED COUNT: 12.8 % (ref 11.8–14.4)
ERYTHROCYTE [SEDIMENTATION RATE] IN BLOOD BY PHOTOMETRIC METHOD: 61 MM/HR (ref 0–20)
HCT VFR BLD AUTO: 43 % (ref 40.7–50.3)
HGB BLD-MCNC: 13 G/DL (ref 13–17)
IMM GRANULOCYTES # BLD AUTO: 0.04 K/UL (ref 0–0.3)
IMM GRANULOCYTES NFR BLD: 0 %
LYMPHOCYTES NFR BLD: 2.07 K/UL (ref 1.1–3.7)
LYMPHOCYTES RELATIVE PERCENT: 23 % (ref 24–43)
MCH RBC QN AUTO: 27.5 PG (ref 25.2–33.5)
MCHC RBC AUTO-ENTMCNC: 30.2 G/DL (ref 28.4–34.8)
MCV RBC AUTO: 90.9 FL (ref 82.6–102.9)
MONOCYTES NFR BLD: 0.63 K/UL (ref 0.1–1.2)
MONOCYTES NFR BLD: 7 % (ref 3–12)
NEUTROPHILS NFR BLD: 63 % (ref 36–65)
NEUTS SEG NFR BLD: 5.85 K/UL (ref 1.5–8.1)
NRBC BLD-RTO: 0 PER 100 WBC
PLATELET # BLD AUTO: 274 K/UL (ref 138–453)
PMV BLD AUTO: 10.6 FL (ref 8.1–13.5)
RBC # BLD AUTO: 4.73 M/UL (ref 4.21–5.77)
SURGICAL PATHOLOGY REPORT: NORMAL
WBC OTHER # BLD: 9.2 K/UL (ref 3.5–11.3)

## 2024-02-29 PROCEDURE — 99232 SBSQ HOSP IP/OBS MODERATE 35: CPT | Performed by: INTERNAL MEDICINE

## 2024-02-29 PROCEDURE — 6370000000 HC RX 637 (ALT 250 FOR IP)

## 2024-02-29 PROCEDURE — 86140 C-REACTIVE PROTEIN: CPT

## 2024-02-29 PROCEDURE — 85652 RBC SED RATE AUTOMATED: CPT

## 2024-02-29 PROCEDURE — 6360000002 HC RX W HCPCS

## 2024-02-29 PROCEDURE — 6370000000 HC RX 637 (ALT 250 FOR IP): Performed by: INTERNAL MEDICINE

## 2024-02-29 PROCEDURE — 2580000003 HC RX 258

## 2024-02-29 PROCEDURE — 99238 HOSP IP/OBS DSCHRG MGMT 30/<: CPT | Performed by: INTERNAL MEDICINE

## 2024-02-29 PROCEDURE — 6360000002 HC RX W HCPCS: Performed by: INTERNAL MEDICINE

## 2024-02-29 PROCEDURE — 85025 COMPLETE CBC W/AUTO DIFF WBC: CPT

## 2024-02-29 PROCEDURE — 36415 COLL VENOUS BLD VENIPUNCTURE: CPT

## 2024-02-29 RX ORDER — INSULIN GLARGINE 100 [IU]/ML
18 INJECTION, SOLUTION SUBCUTANEOUS 2 TIMES DAILY
Status: DISCONTINUED | OUTPATIENT
Start: 2024-02-29 | End: 2024-02-29 | Stop reason: HOSPADM

## 2024-02-29 RX ADMIN — OXYCODONE 5 MG: 5 TABLET ORAL at 11:03

## 2024-02-29 RX ADMIN — GABAPENTIN 600 MG: 600 TABLET ORAL at 13:47

## 2024-02-29 RX ADMIN — METOPROLOL TARTRATE 50 MG: 50 TABLET ORAL at 08:12

## 2024-02-29 RX ADMIN — LISINOPRIL 40 MG: 20 TABLET ORAL at 08:11

## 2024-02-29 RX ADMIN — PIPERACILLIN AND TAZOBACTAM 3375 MG: 3; .375 INJECTION, POWDER, FOR SOLUTION INTRAVENOUS at 05:17

## 2024-02-29 RX ADMIN — PANTOPRAZOLE SODIUM 40 MG: 40 TABLET, DELAYED RELEASE ORAL at 05:14

## 2024-02-29 RX ADMIN — ASPIRIN 81 MG: 81 TABLET, COATED ORAL at 08:12

## 2024-02-29 RX ADMIN — SODIUM CHLORIDE, PRESERVATIVE FREE 10 ML: 5 INJECTION INTRAVENOUS at 09:34

## 2024-02-29 RX ADMIN — PIPERACILLIN AND TAZOBACTAM 3375 MG: 3; .375 INJECTION, POWDER, FOR SOLUTION INTRAVENOUS at 13:00

## 2024-02-29 RX ADMIN — GABAPENTIN 600 MG: 600 TABLET ORAL at 08:13

## 2024-02-29 RX ADMIN — INSULIN GLARGINE 18 UNITS: 100 INJECTION, SOLUTION SUBCUTANEOUS at 08:12

## 2024-02-29 RX ADMIN — Medication 2000 MG: at 13:47

## 2024-02-29 ASSESSMENT — ENCOUNTER SYMPTOMS
EYE REDNESS: 0
APNEA: 0
EYE DISCHARGE: 0
PHOTOPHOBIA: 0
COLOR CHANGE: 1
ABDOMINAL DISTENTION: 0
EYE PAIN: 0

## 2024-02-29 ASSESSMENT — PAIN SCALES - GENERAL
PAINLEVEL_OUTOF10: 4
PAINLEVEL_OUTOF10: 7
PAINLEVEL_OUTOF10: 4

## 2024-02-29 ASSESSMENT — PAIN DESCRIPTION - ORIENTATION
ORIENTATION: RIGHT

## 2024-02-29 ASSESSMENT — PAIN DESCRIPTION - LOCATION
LOCATION: FOOT
LOCATION: FOOT

## 2024-02-29 ASSESSMENT — PAIN DESCRIPTION - DESCRIPTORS: DESCRIPTORS: ACHING

## 2024-02-29 NOTE — DISCHARGE INSTRUCTIONS
Podiatry:  -Please make an attended follow-up visit with Dr. Gandhi outpatient 1 week after discharge  -Please continue performing every other day dressing changes and evaluations to right lower extremity wounds consisting of the following:   -Foot: Iodoform packing, 4 x 4's, Kerlix and Ace  -Please keep your lower extremity wounds dry clean and covered at all times until follow-up visit  -Please take all prescribed medications as instructed  -Please restart all home medications as prescribed  -Please return to the hospital if any of the following local signs of infection arise: Increased/streaking redness, pain, swelling, drainage or malodor  -Please return to the hospital for any the following systemic signs of infection arise: Nausea, vomiting, fever, chills, diarrhea, shortness of breath or chest pain    Monitor blood pressure and blood sugars closely at home and call your family

## 2024-02-29 NOTE — DISCHARGE INSTR - COC
Continuity of Care Form    Patient Name: Braden Baptiste   :  1970  MRN:  [id number]    Admit date:  2024  Discharge date:  2024    Code Status Order: Full Code   Advance Directives:   Advance Care Flowsheet Documentation       Date/Time Healthcare Directive Type of Healthcare Directive Copy in Chart Healthcare Agent Appointed Healthcare Agent's Name Healthcare Agent's Phone Number    24 1242 No, patient does not have an advance directive for healthcare treatment -- -- -- -- --            Admitting Physician:  Vel Castillo MD  PCP: Umesh Zhang MD    Discharging Nurse: DAVIN Bhat  Discharging Hospital Unit/Room#: 0243/0243-01  Discharging Unit Phone Number: [phone]    Emergency Contact:   Extended Emergency Contact Information  Primary Emergency Contact: ILIANA BAPTISTE  Address: [address] [place] [address]  Home Phone: [phone]  Relation: Parent    Past Surgical History:  Past Surgical History:   Procedure Laterality Date    ARM SURGERY Left     ulnar nerve release    CARDIAC SURGERY      cardiac catheterization    CERVICAL FUSION N/A 2021    C 5-6 CERVICAL DISCECTOMY FUSION ANTERIOR - MEDTRONICS performed by Ulices Escudero MD at [facility] OR    FOOT DEBRIDEMENT Right 2024    *ADD ON, PAMELA TF* FOOT DEBRIDEMENT INCISION AND DRAINAGE WITH BONE BIOPSIES performed by Marline Johnson DPM at [facility] OR     CATH POWER PICC SINGLE  2024    INCISION AND DRAINAGE FOOT Right 2024    KNEE ARTHROSCOPY Bilateral     SHOULDER ARTHROPLASTY Bilateral     THROAT SURGERY         Immunization History:     There is no immunization history on file for this patient.    Active Problems:  Patient Active Problem List   Diagnosis Code    Acute hematogenous osteomyelitis of right foot ([facility]) M86.071    Diabetic ulcer of toe of right foot associated with type 2 diabetes mellitus ([facility]) E11.621, L97.519    MRSA (methicillin resistant staph aureus)

## 2024-02-29 NOTE — PLAN OF CARE
Problem: Discharge Planning  Goal: Discharge to home or other facility with appropriate resources  Outcome: Progressing     Problem: Pain  Goal: Verbalizes/displays adequate comfort level or baseline comfort level  2/29/2024 0428 by Judy Calvo RN  Outcome: Progressing  2/28/2024 1731 by Katia Ceron RN  Outcome: Progressing     Problem: Safety - Adult  Goal: Free from fall injury  2/29/2024 0428 by Judy Calvo RN  Outcome: Progressing  2/28/2024 1731 by Katia Ceron RN  Outcome: Progressing     Problem: Chronic Conditions and Co-morbidities  Goal: Patient's chronic conditions and co-morbidity symptoms are monitored and maintained or improved  Outcome: Progressing     Problem: ABCDS Injury Assessment  Goal: Absence of physical injury  2/29/2024 0428 by Judy Calvo RN  Outcome: Progressing  2/28/2024 1731 by Katia Ceron RN  Outcome: Progressing

## 2024-02-29 NOTE — DISCHARGE SUMMARY
and rhythm, no murmur  Abdomen:  soft, nontender, nondistended, normal bowel sounds, no masses, hepatomegaly, splenomegaly  Extremities:  no edema, redness, tenderness in the calves, right lower extremity wrapped  Skin: Wrapping applied to right lower extremity, no other lesions      Significant therapeutic interventions: Patient underwent Incision and drainage down to the level bone, right foot  Deep trocar bone biopsies x 2, right foot  By podiatry on 2/26/2024    Significant Diagnostic Studies:   Labs / Micro:  CBC:   Lab Results   Component Value Date/Time    WBC 9.2 02/29/2024 07:24 AM    RBC 4.73 02/29/2024 07:24 AM    HGB 13.0 02/29/2024 07:24 AM    HCT 43.0 02/29/2024 07:24 AM    MCV 90.9 02/29/2024 07:24 AM    MCH 27.5 02/29/2024 07:24 AM    MCHC 30.2 02/29/2024 07:24 AM    RDW 12.8 02/29/2024 07:24 AM     02/29/2024 07:24 AM     BMP:    Lab Results   Component Value Date/Time    GLUCOSE 137 02/28/2024 07:03 AM     02/28/2024 07:03 AM    K 4.1 02/28/2024 07:03 AM     02/28/2024 07:03 AM    CO2 24 02/28/2024 07:03 AM    ANIONGAP 10 02/28/2024 07:03 AM    BUN 15 02/28/2024 07:03 AM    CREATININE 0.9 02/28/2024 07:03 AM    BUNCRER 18 02/23/2021 06:12 AM    CALCIUM 8.9 02/28/2024 07:03 AM    LABGLOM >60 02/28/2024 07:03 AM    GFRAA >60 02/23/2021 06:12 AM    GFR      02/23/2021 06:12 AM    GFR NOT REPORTED 02/23/2021 06:12 AM        Radiology:  MRI FOOT RIGHT W WO CONTRAST    Result Date: 2/24/2024  Soft tissue defect at the plantar aspect of the lateral forefoot, between the 4th,and 5th proximal phalanges.  There is edema like marrow signal in the 4th and 5th metatarsal heads, as well as the 4th and 5th proximal phalanges, which could represent early acute osteomyelitis. Cellulitis of the forefoot.  No well-defined drainable abscess is seen. Mildly displaced fracture of the 1st distal phalanx.  There is T1 and T2 hypointense marrow signal involving the fractured 1st distal phalanx tuft,

## 2024-02-29 NOTE — PROGRESS NOTES
[facility] -   Infectious diseases evaluation  admission date 2/22/2024    reason for consultation:   R diab foot facsitis     Impression :   Current:  R diabetic foot cellulitis and subcutaneous emphysema  R 1st digit proximal and distal phalanx osteomyelitis   R foot abscess 5th MTS area - drained bedside 2/24  Prolonged CRP and elevated ESR    Other:    Discussion / summary of stay / plan of care/ Recommendations:     HENCE:   Per podiatry, no clear gas formation  MRI foot - 4-5Th MTS OM  Bedside I/D 2/23 - kleb multiS and MSSA  Operative I/D 2/26 pend bone biopsy 4-5th MTS  Keep zosyn /and adjust to final cx      Infection Control Recommendations   [place] Precautions  Contact Isolation       Antimicrobial Stewardship Recommendations   Simplification of therapy  Targeted therapy      History of Present Illness:   Initial history:  Braden Baptiste is a 53 y.o.-year-old male   Who follows with Dr. Gandhi as outpatient, for a wound over the sore area, on the left fifth metatarsal bone.  Over the last few days that foot became red swollen and increase in the pain and warmth, no associated fever.  Came in to [facility] where a CT scan suggested subcutaneous emphysema with osteomyelitis in that area.  MRI is pending at this time, podiatry joint a small abscess around that swollen wound, getting bloody pus, and no surgical plans for now until Monday.  Podiatry do not feel that there is any fasciitis ongoing at this point.  Images are in the system from this morning, the patient feels good otherwise there is no cellulitis extending in the upper leg or around the ankle.                    Interval changes  2/26/2024   Patient Vitals for the past 8 hrs:   BP Temp Temp src Pulse Resp SpO2   02/26/24 2123 128/68 97.8 °F (36.6 °C) Oral 92 16 98 %   02/26/24 1815 -- -- -- -- 16 --   02/26/24 1642 123/77 97.3 °F (36.3 °C) Oral 82 16 97 %   02/26/24 1530 95/76 97.7 °F (36.5 
          Pharmacy Note     Enoxaparin Dose Adjustment    Braden Baptiste is a 53 y.o. male. Pharmacist assessment of enoxaparin dose for VTE prophylaxis.    Recent Labs     02/22/24  0015   BUN 12       Recent Labs     02/22/24  0015   CREATININE 0.8       Estimated Creatinine Clearance: 147 mL/min (based on SCr of 0.8 mg/dL).  Estimated CrCl using Ideal Body Weight: 122 mL/min (based on IBW 80 kg)    Height:   Ht Readings from Last 1 Encounters:   02/23/24 1.854 m (6' 1\")     Weight:  Wt Readings from Last 1 Encounters:   02/23/24 121.3 kg (267 lb 6.7 oz)       The following enoxaparin dose has been adjusted based upon renal function and/or patient weight per P&T Guidelines:             Enoxaparin 40 mg subcutaneously once daily changed to Enoxaparin 30 mg subcutaneously twice daily.      
    Progress Note  Foot and Ankle Surgery    Subjective     Chief Complaint: Right foot wound    Interval History:  Patient seen and evaluated at bedside.  Patient denies any acute overnight events, including nausea, vomiting, fever, chills, shortness of breath.  Patient able to eat breakfast, states he currently feels good.  States that he has not yet received surgical shoe.  Patient still has pain to right foot.  Patient denies all other pedal complaints.    HPI:  Braden Baptiste is a 53 y.o. male seen at [facility] ED for complaint of right foot wound.  Patient follows Dr. Gandhi outpatient.  Patient states he has had wound on right foot, underneath his fifth toe for few weeks now.  Patient states that on Wednesday it was red hot swollen, without him having nausea or vomiting or fever or chills.  Patient was put on doxycycline as outpatient, but failed to become better.  Patient states that he started noticing redness in foot, and that is when he came to the ED.  Patient denies noticing any pus coming from wound.  Patient denies noticing any malodor.  Patient denies all other pedal complaints.  Patient states that he is is a non-smoker.  Patient does not have history of amputation in feet. Denies constitutional symptoms at this point in time.    PCP is Umesh Zhang MD    ROS:   Review of Systems   Constitutional:  Positive for activity change. Negative for appetite change, chills and fever.   HENT: Negative.     Eyes: Negative.    Respiratory: Negative.     Cardiovascular:  Positive for leg swelling.   Gastrointestinal: Negative.  Negative for diarrhea, nausea and vomiting.   Endocrine: Negative.    Genitourinary: Negative.    Musculoskeletal:  Positive for gait problem and joint swelling.   Skin:  Positive for color change and wound. Negative for pallor and rash.   Allergic/Immunologic: Negative.    Hematological: Negative.    Psychiatric/Behavioral: Negative.         Past Medical History   has a past 
    Progress Note  Foot and Ankle Surgery    Subjective     Chief Complaint: Right foot wound; status post incision and drainage with bone biopsies on 2/26/2024    Interval History:  - Patient seen and examined at bedside during rounding this morning  - No acute events overnight.  Vital signs stable at this time.  - Patient states that his pain is well-controlled at this time  - Patient is extremely frustrated and is interested in his plan of care.     Trended labs:  WBC: 9.9  ESR: 73  CRP: 105.9    HPI:  Braden Baptiste is a 53 y.o. male seen at [facility] ED for complaint of right foot wound.  Patient follows Dr. Gandhi outpatient.  Patient states he has had wound on right foot, underneath his fifth toe for few weeks now.  Patient states that on Wednesday it was red hot swollen, without him having nausea or vomiting or fever or chills.  Patient was put on doxycycline as outpatient, but failed to become better.  Patient states that he started noticing redness in foot, and that is when he came to the ED.  Patient denies noticing any pus coming from wound.  Patient denies noticing any malodor.  Patient denies all other pedal complaints.  Patient states that he is is a non-smoker.  Patient does not have history of amputation in feet. Denies constitutional symptoms at this point in time.    PCP is Umesh Zhang MD    Past Medical History   has a past medical history of Diabetes mellitus (HCC), GERD (gastroesophageal reflux disease), and Hypertension.    Past Surgical History   has a past surgical history that includes Cardiac surgery; Throat surgery (2020); Shoulder Arthroplasty (Bilateral); Knee arthroscopy (Bilateral); Arm Surgery (Left); cervical fusion (N/A, 02/22/2021); Incision and drainage foot (Right, 02/26/2024); and Foot Debridement (Right, 2/26/2024).    Medications  Prior to Admission medications    Medication Sig Start Date End Date Taking? Authorizing Provider   cefTRIAXone (ROCEPHIN) infusion Infuse 
    Progress Note  Foot and Ankle Surgery    Subjective     Chief Complaint: Right foot wound; status post incision and drainage with bone biopsies on 2/26/2024    Interval History:  Patient seen and examined at bedside.  No acute events overnight.  Afebrile, vital signs stable   Patient states desire to leave, had long discussion about the benefits needed to staying here, awaiting for results from culture and figuring out next surgical steps.  Patient acknowledged his understanding.      HPI:  Braden Baptiste is a 53 y.o. male seen at [facility] ED for complaint of right foot wound.  Patient follows Dr. Gandhi outpatient.  Patient states he has had wound on right foot, underneath his fifth toe for few weeks now.  Patient states that on Wednesday it was red hot swollen, without him having nausea or vomiting or fever or chills.  Patient was put on doxycycline as outpatient, but failed to become better.  Patient states that he started noticing redness in foot, and that is when he came to the ED.  Patient denies noticing any pus coming from wound.  Patient denies noticing any malodor.  Patient denies all other pedal complaints.  Patient states that he is is a non-smoker.  Patient does not have history of amputation in feet. Denies constitutional symptoms at this point in time.    PCP is Umesh Zhang MD    Past Medical History   has a past medical history of Diabetes mellitus (HCC), GERD (gastroesophageal reflux disease), and Hypertension.    Past Surgical History   has a past surgical history that includes Cardiac surgery; Throat surgery (2020); Shoulder Arthroplasty (Bilateral); Knee arthroscopy (Bilateral); Arm Surgery (Left); cervical fusion (N/A, 02/22/2021); Incision and drainage foot (Right, 02/26/2024); and Foot Debridement (Right, 2/26/2024).    Medications  Prior to Admission medications    Medication Sig Start Date End Date Taking? Authorizing Provider   atorvastatin (LIPITOR) 40 MG tablet Take 1 tablet 
[facility]  Office: [phone]  Sadiq Rodrigues DO, Tamir Ortega DO, Matt Dempsey DO, Geremias Bush DO, Cristal Mcrae MD, Ayesha Morris MD, Ryan Leiva MD, Esperanza Hoang MD,  Justino Cevallos MD, Vel Castillo MD, Dorothy Maldonado MD,  Grey De La Torre DO, Shawanda Bledsoe MD, Fareed Cruz MD, Alek Rodrigues DO, Abida Servin MD,  Zeeshan Ham DO, Hien Looney MD, Bailee Albarran MD, Anna Beltran MD, Marlin Pittman MD,  Adan Anglin MD, Shannon Coronado MD, Shelli Ronquillo MD, Belkys De La Paz MD, Henri Mosley MD, Xiomara Dhillon MD, Brien Pike DO, Jonas Diana DO, Jennifer Hamilton MD,  Jim Riley MD, Shirley Waterhouse, CNP,  Paulina Matute, CNP, Nicho Triana, CNP,  Lilly Marshall, DNP, Ludy Durbin, CNP, Dinah Merida, CNP, Judy Manning CNP, Leslie Llamas, CNP, Yadira Parker, CNP, Tiana Yao, PA-C, Sonal Barakat, PA-C, Norma Rod, CNP, Irasema Pacheco, CNP, Erica Rivera, CNP, Shahla Joyner, CNS, Andreina Singh, CNP, Martha Torres, CNP, Tracy Schwab, CNP         [facility]   IN-PATIENT SERVICE   [facility]    Progress Note    2/28/2024    12:19 PM    Name:   Braden Baptiste  MRN:     [id number]     Acct:      [id number]   Room:   0243/0243-01   Day:  5  Admit Date:  2/22/2024 11:13 PM    PCP:   Umesh Zhang MD  Code Status:  Full Code    Subjective:     C/C:   Chief Complaint   Patient presents with    Foot Pain     Increased redness today     Interval History Status: .     Patient's blood sugars are much better with adjusting of the insulin dose.  Hemodynamically stable.  Brief History:     Per Documentation     53-year-old male with past medical history of diabetes, hypertension history of diabetic right foot wound who was AT his podiatric office on Wednesday and he was told to come to emergency department if his foot wound getting worse, and he came into the hospital due to pain and increased redness in his wound, 
Discharged patient via wheelchair with all his belongings. Home medications and home instructions explained to the patient. All questions answered, verbalized understanding.  
[facility]   Pharmacy Pharmacokinetic Monitoring Service - Vancomycin     Braden Baptiste is a 53 y.o. male starting on vancomycin therapy for necrotizing fasciitis. Pharmacy consulted by Geremias Bush  for monitoring and adjustment.    Target Concentration: Goal AUC/MIKAL 400-600 mg*hr/L    Additional Antimicrobials: zosyn    Pertinent Laboratory Values:   Wt Readings from Last 1 Encounters:   02/23/24 121.3 kg (267 lb 6.7 oz)     Temp Readings from Last 1 Encounters:   02/23/24 98.2 °F (36.8 °C) (Oral)     Estimated Creatinine Clearance: 147 mL/min (based on SCr of 0.8 mg/dL).  Recent Labs     02/22/24  0015   CREATININE 0.8   BUN 12   WBC 13.3*          Pertinent Cultures:  Culture Date Source Results           MRSA Nasal Swab: was ordered by provider, awaiting results.    Plan:  Dosing recommendations based on Bayesian software  Start vancomycin 1750 mg every 12 horus  Anticipated AUC of 489 and trough concentration of 13 at steady state  Renal labs as indicated   Vancomycin concentration ordered for   @     Pharmacy will continue to monitor patient and adjust therapy as indicated    Thank you for the consult,  Pramod Valdivia RPH  2/23/2024 3:04 AM   
Notifed Dr Castillo regarding Pt sugar prebreakfast is 209 and refusing 1u of humalog insulin as well as lunch 242mg/dl, he refused 1u of humalog.  
Occupational Therapy    [facility]  Occupational Therapy Not Seen Note    DATE: 2024    NAME: Braden Baptiste  MRN: [id number]   : 1970      Patient not seen this date for Occupational Therapy due to:    Patient independent with ADLs and functional mobility with no acute OT needs. Will defer OT evaluation at this time. Please reorder OT if future needs arise. Pt in agreement.    Electronically signed by BRITNEY OCHOA/L on 2024 at 1:15 PM   
Patient refusing blood sugar pokes.  Reports levels from his dexcom.  
Per pt dexcom blood sugar 232.   
Per pt dexcom blood sugar 241.   
Per pt dexcom, blood sugar 349 currently.  
Per pt dexcom, blood sugar currently 214.   
Per pt dexcom, blood sugar is 190.  
Per pt dexcom, blood sugar is 364.  
Per pt dexcom, blood sugar is 380.   
Per pt dexcom, current blood sugar is 154.  
Physical Therapy  Facility/Department: [address] ORTHO/MED SURG  Physical Therapy Initial Assessment    Name: Braden Baptiste  : 1970  MRN: [id number]  Date of Service: 2024    Discharge Recommendations:  No therapy recommended at discharge          Patient Diagnosis(es): The encounter diagnosis was Necrotizing fasciitis (HCC).  Past Medical History:  has a past medical history of Diabetes mellitus (HCC), GERD (gastroesophageal reflux disease), and Hypertension.  Past Surgical History:  has a past surgical history that includes Cardiac surgery; Throat surgery (); Shoulder Arthroplasty (Bilateral); Knee arthroscopy (Bilateral); Arm Surgery (Left); and cervical fusion (N/A, 2021).    Assessment   Assessment: The pt ambulated 150 ft without a device x SBA with Heel WB R LE. He ambulated safely with no c/o increased pain or dizziness. No further PT intervention is needed at this time.  Therapy Prognosis: Good  Decision Making: Medium Complexity  Requires PT Follow-Up: No  Activity Tolerance  Activity Tolerance: Patient tolerated treatment well     Plan   Physical Therapy Plan  General Plan: Discharge with evaluation only  Safety Devices  Type of Devices: Nurse notified, Left in bed, Call light within reach  Restraints  Restraints Initially in Place: No     Restrictions  Restrictions/Precautions  Required Braces or Orthoses?: No  Position Activity Restriction  Other position/activity restrictions: Up mwith assist     Subjective   General  Patient assessed for rehabilitation services?: Yes  Response To Previous Treatment: Not applicable  Family / Caregiver Present: No  Follows Commands: Within Functional Limits  Subjective  Subjective: Pt reports R foot pain 6/10         Social/Functional History  Social/Functional History  Lives With: Spouse  Type of Home: House  Home Layout: One level  Home Access: Stairs to enter without rails  Entrance Stairs - Number of Steps: 1 step  Bathroom Shower/Tub: Tub/Shower 
[facility]  Office: [phone]  Sadiq Rodrigues DO, Tamir Ortega DO, Matt Dempsey DO, Geremias Bush DO, Cristal Mcrae MD, Ayesha Morris MD, Ryan Leiva MD, Esperanza Hoang MD,  Justino Cevallos MD, Vel Castillo MD, Dorothy Maldonado MD,  Grey De La Torre DO, Shawanda Bledsoe MD, Fareed Cruz MD, Alek Rodrigues DO, Abida Servin MD,  Zeeshan Ham DO, Hien Looney MD, Bailee Albararn MD, Anna Beltran MD, Marlin Pittman MD,  Adan Anglin MD, Shannon Coronado MD, Shelli Ronquillo MD, Belkys De La Paz MD, Henri Mosley MD, Xiomara Dhillon MD, Brien Pike DO, Jonas Diana DO, Jennifer Hamilton MD,  Jim Riley MD, Shirley Waterhouse, CNP,  Paulina Matute, CNP, Nicho Triana, CNP,  Lilly Marshall, DNP, Ludy Durbin, CNP, Dinah Merida, CNP, Judy Manning CNP, Leslie Llamas, CNP, Yadira Parker, CNP, Tiana Yao, PA-C, Sonal Barakat, PA-C, Norma Rod, CNP, Irasema Pacheco, CNP, Erica Rivera, CNP, Shahla Joyner, CNS, Andreina Singh, CNP, Martha Torres, CNP, Tracy Schwab, CNP         [facility]   IN-PATIENT SERVICE   [facility]    Progress Note    2/27/2024    1:04 PM    Name:   Braden Baptiste  MRN:     [id number]     Acct:      [id number]   Room:   0243/0243-01   Day:  4  Admit Date:  2/22/2024 11:13 PM    PCP:   Umesh Zhang MD  Code Status:  Full Code    Subjective:     C/C:   Chief Complaint   Patient presents with    Foot Pain     Increased redness today     Interval History Status: .     Patient is hemodynamically stable, blood sugar is elevated but patient refuses carb controlled diet.  Underwent surgery yesterday  Brief History:     Per Documentation     53-year-old male with past medical history of diabetes, hypertension history of diabetic right foot wound who was AT his podiatric office on Wednesday and he was told to come to emergency department if his foot wound getting worse, and he came into the hospital due to pain and 
Pt Dexcom reads 209 for blood sugar   
Pt blood sugar per dexcom is 289.  
Pt dexcom reading 250 bs   
RBS as follows using his dexcom    136 at 8am    171 at 930am    176 at 11am    226 at 5pm      
[facility]  Office: [phone]  Sadiq Rodrigues DO, Tamir Ortega DO, Matt Dempsey DO, Geremias Bush DO, Cristal Mcrae MD, Ayesha Morris MD, Ryan Leiva MD, Esperanza Hoang MD,  Justino Cevallos MD, Vel Castillo MD, Dorothy Maldondao MD,  Grey De La Torre DO, Shawanda Bledsoe MD, Fareed Cruz MD, Alek Rodrigues DO, Abida Servin MD,  Zeeshan Ham DO, Hien Looney MD, Bailee Albarran MD, Anna Beltran MD, Marlin Pittman MD,  Adan Anglin MD, Shannon Coronado MD, Shelli Ronquillo MD, Belkys De La Paz MD, Henri Mosley MD, Xiomara Dhillon MD, Brien Pike DO, Jonas Diana DO, Jennifer Hamilton MD,  Jim Riley MD, Shirley Waterhouse, CNP,  Paulina Matute, CNP, Nicho Triana, CNP,  Lilly Marshall, DNP, Ludy Durbin, CNP, Dinah Merida, CNP, Judy Manning CNP, Leslie Llamas, CNP, Yadira Parker, CNP, Tiana Yao, PA-C, Sonal Barakat, PA-C, Norma Rod, CNP, Irasema Pacheco, CNP, Erica Rivera, CNP, Shahla Joyner, CNS, Adnreina Singh, CNP, Martha Torres, CNP, Tracy Schwab, CNP         [facility]   IN-PATIENT SERVICE   [facility]    Progress Note    2/25/2024    8:09 AM    Name:   Braden Baptiste  MRN:     [id number]     Acct:      [id number]   Room:   0243/0243-01   Day:  2  Admit Date:  2/22/2024 11:13 PM    PCP:   Umesh Zhang MD  Code Status:  Full Code    Subjective:     C/C:   Chief Complaint   Patient presents with    Foot Pain     Increased redness today     Interval History Status: not changed.     Patient seen and examined  Denies any acute concerns  Right lower extremity wrapped  Possible OR on Monday.  ID and podiatry on board.  Had MRI yesterday that showed soft tissue defect at the plantar aspect of the lateral forefoot, between the  4th,and 5th proximal phalanges.  There is edema like marrow signal in the 4th  and 5th metatarsal heads, as well as the 4th and 5th proximal phalanges,  which could represent early acute 
[facility]  Office: [phone]  Sadiq Rodrigues DO, Tamir Ortega DO, Matt Dempsey DO, Geremias Bush DO, Cristal Mcrae MD, Ayesha Morris MD, Ryan Leiva MD, Esperanza Hoang MD,  Justino Cevallos MD, Vel Castillo MD, Dorothy Maldonado MD,  Grey De La Torre DO, Shawanda Bledsoe MD, Fareed Cruz MD, Alek Rodrigues DO, Abida Servin MD,  Zeeshan Ham DO, Hien Looney MD, Bailee Albarran MD, Anna Beltran MD, Marlin Pittman MD,  Adan Anglin MD, Shannon Coronado MD, Shelli Ronquillo MD, Belkys De La Paz MD, Henri Mosley MD, Xiomara Dhillon MD, Brien Pike DO, Jonas Diana DO, Jennifer Hamilton MD,  Jim Riley MD, Shirley Waterhouse, CNP,  Paulina Matute, CNP, Nicho Triana, CNP,  Lilly Marshall, DNP, Ludy Durbin, CNP, Dinah Merida, CNP, Judy Manning CNP, Leslie Llamas, CNP, Yadira Parker, CNP, Tiana Yao, PA-C, Sonal Barakat, PA-C, Norma Rod, CNP, Irasema Pacheco, CNP, Erica Rivera, CNP, Shahla Joyner, CNS, Andreina Singh, CNP, Martha Torres, CNP, Tracy Schwab, CNP         [facility]   IN-PATIENT SERVICE   [facility]    Progress Note    2/24/2024    11:29 AM    Name:   Braden Baptiste  MRN:     [id number]     Acct:      [id number]   Room:   0243/0243-01   Day:  1  Admit Date:  2/22/2024 11:13 PM    PCP:   Umesh Zhang MD  Code Status:  Full Code    Subjective:     C/C:   Chief Complaint   Patient presents with    Foot Pain     Increased redness today     Interval History Status: not changed.     Patient seen. Afebrile. Going for MRI foot. Possible OR on Monday. Podiatry does not think necrotizing fascitis. ID on board. Abx switched.  Poorly controlled DM. Last A1C 9. He has peripheral neuropathy.  CRP high.     Brief History:     Braden Baptiste is a 53 y.o. Non- / non  male who presents with Foot Pain (Increased redness today)   and is admitted to the hospital for the management of Necrotizing fasciitis (HCC).   
Provider, MD Dasha   metoprolol tartrate (LOPRESSOR) 50 MG tablet Take 1 tablet by mouth 2 times daily    ProviderDasha MD    Scheduled Meds:   aspirin  81 mg Oral Daily    atorvastatin  40 mg Oral Nightly    linezolid  600 mg Oral 2 times per day    insulin glargine  10 Units SubCUTAneous Nightly    sodium chloride flush  5-40 mL IntraVENous 2 times per day    enoxaparin  30 mg SubCUTAneous BID    insulin lispro  0-4 Units SubCUTAneous TID WC    insulin lispro  0-4 Units SubCUTAneous Nightly    piperacillin-tazobactam  3,375 mg IntraVENous q8h    gabapentin  600 mg Oral TID    lisinopril  40 mg Oral Daily    metoprolol tartrate  50 mg Oral BID    pantoprazole  40 mg Oral QAM AC     Continuous Infusions:   dextrose      sodium chloride 25 mL/hr at 24 0438     PRN Meds:.sodium chloride flush, glucose, dextrose bolus **OR** dextrose bolus, glucagon (rDNA), dextrose, sodium chloride flush, sodium chloride, potassium chloride **OR** potassium alternative oral replacement **OR** potassium chloride, magnesium sulfate, ondansetron **OR** ondansetron, polyethylene glycol, acetaminophen **OR** acetaminophen, oxyCODONE    Allergies  has No Known Allergies.    Family History  family history is not on file.    Social History   reports that he has been smoking cigarettes. He has never used smokeless tobacco.   reports current alcohol use.   reports no history of drug use.    Objective     Vitals:  Patient Vitals for the past 8 hrs:   BP Temp Temp src SpO2   24 0815 (!) 151/89 98.8 °F (37.1 °C) Oral 95 %       Average, Min, and Max for last 24 hours Vitals:  TEMPERATURE:  Temp  Av.5 °F (36.9 °C)  Min: 98.2 °F (36.8 °C)  Max: 98.8 °F (37.1 °C)    RESPIRATIONS RANGE: Resp  Av  Min: 16  Max: 16    PULSE RANGE: Pulse  Av  Min: 82  Max: 82    BLOOD PRESSURE RANGE:  Systolic (24hrs), Av , Min:130 , Max:151   ; Diastolic (24hrs), Av, Min:75, Max:89      PULSE OXIMETRY RANGE: SpO2  Avg: 
  Diagnosis Date    Diabetes mellitus (HCC)     GERD (gastroesophageal reflux disease)     Hypertension        Past Surgical  History:     Past Surgical History:   Procedure Laterality Date    ARM SURGERY Left     ulnar nerve release    CARDIAC SURGERY      cardiac catheterization    CERVICAL FUSION N/A 02/22/2021    C 5-6 CERVICAL DISCECTOMY FUSION ANTERIOR - MEDTRONICS performed by Ulices Escudero MD at [facility] OR    FOOT DEBRIDEMENT Right 2/26/2024    *ADD ON, WANTS TF* FOOT DEBRIDEMENT INCISION AND DRAINAGE WITH BONE BIOPSIES performed by Marline Johnson DPM at [facility] OR    INCISION AND DRAINAGE FOOT Right 02/26/2024    KNEE ARTHROSCOPY Bilateral     SHOULDER ARTHROPLASTY Bilateral     THROAT SURGERY  2020       Medications:      insulin glargine  15 Units SubCUTAneous BID    aspirin  81 mg Oral Daily    atorvastatin  40 mg Oral Nightly    sodium chloride flush  5-40 mL IntraVENous 2 times per day    enoxaparin  30 mg SubCUTAneous BID    insulin lispro  0-4 Units SubCUTAneous TID WC    insulin lispro  0-4 Units SubCUTAneous Nightly    piperacillin-tazobactam  3,375 mg IntraVENous q8h    gabapentin  600 mg Oral TID    lisinopril  40 mg Oral Daily    metoprolol tartrate  50 mg Oral BID    pantoprazole  40 mg Oral QAM AC       Social History:     Social History     Socioeconomic History    Marital status: Single     Spouse name: Not on file    Number of children: Not on file    Years of education: Not on file    Highest education level: Not on file   Occupational History    Not on file   Tobacco Use    Smoking status: Every Day     Types: Cigarettes    Smokeless tobacco: Never   Vaping Use    Vaping Use: Never used   Substance and Sexual Activity    Alcohol use: Yes     Comment: occcasionally     Drug use: Never    Sexual activity: Not on file   Other Topics Concern    Not on file   Social History Narrative    Not on file     Social Determinants of Health     Financial Resource Strain: Not on file   Food Insecurity: 
Last Year: No     Number of Places Lived in the Last Year: 1     Unstable Housing in the Last Year: No       Family History:   History reviewed. No pertinent family history.   Medical Decision Making:   I have independently reviewed/ordered the following labs:    CBC with Differential:   Recent Labs     02/26/24  0639 02/27/24  0609   WBC 10.4 12.1*   HGB 12.0* 12.8*   HCT 38.7* 40.8    274   LYMPHOPCT 16* 13*   MONOPCT 9 6       BMP:  Recent Labs     02/25/24  0550      K 3.8      CO2 22   BUN 10   CREATININE 0.8       Hepatic Function Panel: No results for input(s): \"PROT\", \"LABALBU\", \"BILIDIR\", \"IBILI\", \"BILITOT\", \"ALKPHOS\", \"ALT\", \"AST\" in the last 72 hours.  No results for input(s): \"RPR\" in the last 72 hours.  No results for input(s): \"HIV\" in the last 72 hours.  No results for input(s): \"BC\" in the last 72 hours.  Lab Results   Component Value Date/Time    CREATININE 0.8 02/25/2024 05:50 AM    GLUCOSE 181 02/25/2024 05:50 AM       Detailed results:        Thank you for allowing us to participate in the care of this patient.Please call with questions.    This note is created with the assistance of a speech recognition program.  While intending to generate adocument that actually reflects the content of the visit, the document can still have some errors including those of syntax and sound a like substitutions which may escape proof reading.  It such instances, actual meaningcan be extrapolated by contextual diversion.    Noam Rubalcava MD  Internal Medicine Resident, PGY-1  [facility]; [place], [place]          I have discussed the care of the patient, including pertinent history and exam findings,  with the resident., student or CNP- I have seen and examined the patient and the key elements of all parts of the encounter have been performed by me.  I have decided the assessment, plan and orders as documented by the resident.student or CNP    Deann Knowles, Infectious 
dorsally.       Physical Examination:        General appearance:  alert, cooperative and no distress  Mental Status:  oriented to person, place and time and normal affect  Lungs:  clear to auscultation bilaterally, normal effort  Heart:  regular rate and rhythm, no murmur  Abdomen:  soft, nontender, nondistended, normal bowel sounds, no masses, hepatomegaly, splenomegaly  Extremities:  no edema, redness, tenderness in the calves, right lower extremity wrapped  Skin: Wrapping applied to right lower extremity, no other lesions    Assessment:        Hospital Problems             Last Modified POA    * (Principal) Diabetic ulcer of toe of right foot associated with type 2 diabetes mellitus ([facility]) 2/24/2024 Yes    Acute hematogenous osteomyelitis of right foot ([facility]) 2/24/2024 Yes    Uncontrolled diabetes mellitus with hyperglycemia, with long-term current use of insulin ([facility]) 2/24/2024 Yes    Essential hypertension 2/24/2024 Yes    Coronary artery disease involving native coronary artery of native heart without angina pectoris 2/24/2024 Yes    Peripheral sensory neuropathy due to type 2 diabetes mellitus ([facility]) 2/24/2024 Yes    Diabetic ulcer of toe of right foot associated with diabetes mellitus due to underlying condition, with bone involvement without evidence of necrosis ([facility]) 2/25/2024 Yes    Pyogenic inflammation of bone ([facility]) 2/25/2024 Yes    Type 2 diabetes mellitus with foot ulcer, with long-term current use of insulin ([facility]) 2/25/2024 Yes    Ulcerated, foot, right, with fat layer exposed ([facility]) 2/25/2024 Yes    Type 2 diabetes mellitus with right diabetic foot infection ([facility]) 2/25/2024 Yes       Plan:        Right diabetic foot cellulitis with osteomyelitis-currently on Zosyn and Zyvox.  Follow-up on the cultures.  Can probably stop Zyvox if MSSA.  Plan for OR by podiatry today  Poorly controlled diabetes with peripheral neuropathy-continue Lantus but at lower dose.  Outpatient he is on Ozempic and metformin as 
discussed in detail with the patient  Patient to be admitted under medicine for management  Preoperative radiographic imaging order/examined and reviewed with the patient in detail at bedside  Pockets of emphysema noted to the fourth interspace surrounding the fifth MTPJ, without osseous involvement.  Preoperative order placed for CT to be obtained   Pocket of emphysema, most likely free air without concern for necrotizing fasciitis.  No osseous involvement involving the fifth toe, metatarsal.  Possible chronic osteomyelitis of the first metatarsal noted.  Preoperative order for MRI with contrast placed  There is marrow edema changes noted throughout the fourth and fifth metatarsal heads as well as the adjacent proximal phalangeal bases of 4 and 5. This is seen on T2 but not on T1.  In the setting of an adjacent ulceration this is concerning for osteomyelitis though no bony erosions are seen.  There is cellulitis and possible underlying abscess in the fourth metatarsal interspace.  Patient is on antibiotics per infectious disease  Patient status post incision and drainage with bone biopsies on 2/26/2024.  Based on the patient's clinical picture and recent labs, plan from podiatry standpoint at this time is to discharge the patient home with home health care for regular dressing changes consisting of iodoform packing, 4 x 4's, Kerlix and Ace.  He is to follow-up outpatient with Dr. Gandhi in the wound care center in [facility] for continued outpatient therapy.  He is to remain weightbearing as tolerated to the heel only in his surgical shoe or cam boot.  No further surgical intervention planned from podiatry standpoint at this time.  Dressing changed to Right lower extremity: Betadine soaked packing, Betadine soaked gauze, 4 x 4, ABD, Kerlix, Ace  Weightbearing as tolerated to the heel in a surgical shoe  Discussed with Dr. Johnson and Dr. Hiram Mccain, [facility]   Foot and Ankle Surgery  2/29/2024 at 1:48 
reviewed/ordered the following labs:    CBC with Differential:   Recent Labs     02/28/24  0703 02/29/24  0724   WBC 9.9 9.2   HGB 12.7* 13.0   HCT 41.8 43.0    274   LYMPHOPCT 25 23*   MONOPCT 7 7       BMP:  Recent Labs     02/28/24  0703      K 4.1      CO2 24   BUN 15   CREATININE 0.9       Hepatic Function Panel: No results for input(s): \"PROT\", \"LABALBU\", \"BILIDIR\", \"IBILI\", \"BILITOT\", \"ALKPHOS\", \"ALT\", \"AST\" in the last 72 hours.  No results for input(s): \"RPR\" in the last 72 hours.  No results for input(s): \"HIV\" in the last 72 hours.  No results for input(s): \"BC\" in the last 72 hours.  Lab Results   Component Value Date/Time    CREATININE 0.9 02/28/2024 07:03 AM    GLUCOSE 137 02/28/2024 07:03 AM       Detailed results:        Thank you for allowing us to participate in the care of this patient.Please call with questions.    This note is created with the assistance of a speech recognition program.  While intending to generate adocument that actually reflects the content of the visit, the document can still have some errors including those of syntax and sound a like substitutions which may escape proof reading.  It such instances, actual meaningcan be extrapolated by contextual diversion.    Noam Rubalcava MD  Internal Medicine Resident, PGY-1  [facility]; [place], [place]          I have discussed the care of the patient, including pertinent history and exam findings,  with the resident., student or CNP- I have seen and examined the patient and the key elements of all parts of the encounter have been performed by me.  I have decided the assessment, plan and orders as documented by the resident.student or CNP    Deann Knowles, Infectious Diseases

## 2024-02-29 NOTE — PLAN OF CARE
Problem: Discharge Planning  Goal: Discharge to home or other facility with appropriate resources  2/29/2024 1613 by Katia Ceron RN  Outcome: Completed  2/29/2024 0428 by Judy Calvo RN  Outcome: Progressing     Problem: Pain  Goal: Verbalizes/displays adequate comfort level or baseline comfort level  2/29/2024 1613 by Katia Ceron RN  Outcome: Completed  2/29/2024 0428 by Judy Calvo RN  Outcome: Progressing     Problem: Safety - Adult  Goal: Free from fall injury  2/29/2024 1613 by Katia Ceron RN  Outcome: Completed  2/29/2024 0428 by Judy Calvo RN  Outcome: Progressing     Problem: Chronic Conditions and Co-morbidities  Goal: Patient's chronic conditions and co-morbidity symptoms are monitored and maintained or improved  2/29/2024 1613 by Katia Ceron RN  Outcome: Completed  2/29/2024 0428 by Judy Calvo RN  Outcome: Progressing     Problem: ABCDS Injury Assessment  Goal: Absence of physical injury  2/29/2024 1613 by Katia Ceron RN  Outcome: Completed  2/29/2024 0428 by Judy Calvo RN  Outcome: Progressing

## 2024-03-01 LAB
MICROORGANISM SPEC CULT: ABNORMAL
MICROORGANISM/AGENT SPEC: ABNORMAL
MICROORGANISM/AGENT SPEC: ABNORMAL
SPECIMEN DESCRIPTION: ABNORMAL

## 2024-03-02 LAB
MICROORGANISM SPEC CULT: ABNORMAL
MICROORGANISM SPEC CULT: ABNORMAL
MICROORGANISM SPEC CULT: NORMAL
MICROORGANISM/AGENT SPEC: ABNORMAL
MICROORGANISM/AGENT SPEC: ABNORMAL
MICROORGANISM/AGENT SPEC: NORMAL
MICROORGANISM/AGENT SPEC: NORMAL
SPECIMEN DESCRIPTION: ABNORMAL
SPECIMEN DESCRIPTION: NORMAL

## 2024-03-04 LAB
MICROORGANISM SPEC CULT: NORMAL
MICROORGANISM/AGENT SPEC: NORMAL
SPECIMEN DESCRIPTION: NORMAL

## 2024-03-11 LAB
MICROORGANISM SPEC CULT: NORMAL
MICROORGANISM/AGENT SPEC: NORMAL
SPECIMEN DESCRIPTION: NORMAL

## 2024-03-18 LAB
MICROORGANISM SPEC CULT: NORMAL
MICROORGANISM/AGENT SPEC: NORMAL
SPECIMEN DESCRIPTION: NORMAL

## 2024-03-25 LAB
MICROORGANISM SPEC CULT: NORMAL
MICROORGANISM/AGENT SPEC: NORMAL
SPECIMEN DESCRIPTION: NORMAL

## 2024-03-27 ENCOUNTER — OFFICE VISIT (OUTPATIENT)
Dept: INFECTIOUS DISEASES | Age: 54
End: 2024-03-27
Payer: COMMERCIAL

## 2024-03-27 VITALS
BODY MASS INDEX: 33.93 KG/M2 | SYSTOLIC BLOOD PRESSURE: 131 MMHG | DIASTOLIC BLOOD PRESSURE: 89 MMHG | HEART RATE: 87 BPM | WEIGHT: 256 LBS | TEMPERATURE: 97.5 F | HEIGHT: 73 IN

## 2024-03-27 DIAGNOSIS — L08.9 TYPE 2 DIABETES MELLITUS WITH RIGHT DIABETIC FOOT INFECTION (HCC): Primary | ICD-10-CM

## 2024-03-27 DIAGNOSIS — E11.628 TYPE 2 DIABETES MELLITUS WITH RIGHT DIABETIC FOOT INFECTION (HCC): Primary | ICD-10-CM

## 2024-03-27 PROCEDURE — 3052F HG A1C>EQUAL 8.0%<EQUAL 9.0%: CPT | Performed by: INTERNAL MEDICINE

## 2024-03-27 PROCEDURE — 3075F SYST BP GE 130 - 139MM HG: CPT | Performed by: INTERNAL MEDICINE

## 2024-03-27 PROCEDURE — 3079F DIAST BP 80-89 MM HG: CPT | Performed by: INTERNAL MEDICINE

## 2024-03-27 PROCEDURE — 99214 OFFICE O/P EST MOD 30 MIN: CPT | Performed by: INTERNAL MEDICINE

## 2024-03-27 RX ORDER — CYCLOBENZAPRINE HCL 10 MG
TABLET ORAL
COMMUNITY
Start: 2021-07-06

## 2024-03-27 RX ORDER — PROCHLORPERAZINE 25 MG/1
SUPPOSITORY RECTAL
COMMUNITY
Start: 2024-03-15

## 2024-03-27 RX ORDER — SEMAGLUTIDE 0.68 MG/ML
INJECTION, SOLUTION SUBCUTANEOUS
COMMUNITY

## 2024-03-27 RX ORDER — METOPROLOL SUCCINATE 50 MG/1
50 TABLET, EXTENDED RELEASE ORAL DAILY
COMMUNITY

## 2024-03-27 RX ORDER — PROCHLORPERAZINE 25 MG/1
SUPPOSITORY RECTAL
COMMUNITY
Start: 2024-02-13

## 2024-03-27 ASSESSMENT — ENCOUNTER SYMPTOMS
COLOR CHANGE: 0
EYE DISCHARGE: 0
APNEA: 0
ABDOMINAL DISTENTION: 0

## 2024-03-27 NOTE — PATIENT INSTRUCTIONS
Called [facility] - gave verbal order to Rea to pull line after 4/3/24   Faxed order per her request - [phone]  LS

## 2024-03-27 NOTE — PROGRESS NOTES
[facility] - Initial Consult Note  Today's Date: 3/27/2024    Impression :   Post STV  2/29/24  R diabetic foot cellulitis and subcutaneous emphysema  R 1st digit proximal and distal phalanx osteomyelitis   R foot abscess 5th MTS area -   drained bedside 2/24 Cx- kleb multiS,MSSA, both S keflex and levaquin  OR I/D 2/26 only bone biopsy of 4th and 5th MTS -cx so far neg     Recommendations   Plan for visit 3/27/24  Will finish the dose of 3/3/2024 then pull the PICC line, will send an order to the infusion center  Wound care and hope this wound will close and fill well   Avoid super infection - DC w pt  See me as needed   FU w podiatry    Diagnosis Orders   1. Type 2 diabetes mellitus with right diabetic foot infection (HCC)            Return if symptoms worsen or fail to improve.      History of Present Illness:   Braden Baptiste is a 53 y.o.-year-old  male who presents with   Chief Complaint   Patient presents with    Frequent Infections     Hosp f/u- osteomyelitis and cellulitis   Post STV admission 2/2024    R diabetic foot cellulitis and subcutaneous emphysema  R 1st digit proximal and distal phalanx osteomyelitis   R foot abscess 5th MTS area -   drained bedside 2/24 Cx- kleb multiS,MSSA, both S keflex and levaquin  OR I/D 2/26 only bone biopsy of 4th and 5th MTS -cx so far neg       Per podiatry, no clear gas formation  MRI foot - 4-5Th MTS OM  Bedside I/D 2/23 - kleb multiS and MSSA  Operative I/D 2/26 pend bone biopsy 4-5th MTS - bones were hard and healthy -? Path sent - so far cx are neg. No clear path specimen  Podiatry planning on delayed closure - not this admission   Disc w Dr Johnson  Keep zosyn 2/23 /and adjust to final cx -2/26 OR I/d cx - neg   2/29 - no plans for OR this admit , path negative for acute osteo  DC on ceftriaxone till 4/4/24 - ie 6 weeks and a picc - reconsiled    Visit 3/27/25  Right upper extremity PICC line is looking good  Taking his

## 2024-04-02 ENCOUNTER — TELEPHONE (OUTPATIENT)
Dept: INFECTIOUS DISEASES | Age: 54
End: 2024-04-02

## 2024-04-02 NOTE — TELEPHONE ENCOUNTER
Елена from [facility] called regarding PICC line orders.  Per prescription notes and Dr. Knowles's office notes: Rocephin IV infusions until 4/4/24- 36 doses then pull PICC line.  cbc diff creat LFT weekly - no line draws. Follow up with podiatry.  Follow up with Dr. Knowles as needed.  All questions answered.

## 2025-05-05 ENCOUNTER — HOSPITAL ENCOUNTER (OUTPATIENT)
Dept: PREADMISSION TESTING | Age: 55
Discharge: HOME | End: 2025-05-09
Payer: COMMERCIAL

## 2025-05-05 VITALS
TEMPERATURE: 97.3 F | HEIGHT: 73 IN | DIASTOLIC BLOOD PRESSURE: 69 MMHG | WEIGHT: 251 LBS | BODY MASS INDEX: 33.27 KG/M2 | HEART RATE: 86 BPM | SYSTOLIC BLOOD PRESSURE: 122 MMHG | OXYGEN SATURATION: 97 %

## 2025-05-05 DIAGNOSIS — Z01.818 PRE-OP TESTING: Primary | ICD-10-CM

## 2025-05-05 LAB
ANION GAP SERPL CALCULATED.3IONS-SCNC: 9 MMOL/L (ref 9–16)
BUN SERPL-MCNC: 13 MG/DL (ref 6–20)
CALCIUM SERPL-MCNC: 9.8 MG/DL (ref 8.6–10.4)
CHLORIDE SERPL-SCNC: 103 MMOL/L (ref 98–107)
CO2 SERPL-SCNC: 27 MMOL/L (ref 20–31)
CREAT SERPL-MCNC: 1.3 MG/DL (ref 0.7–1.2)
ERYTHROCYTE [DISTWIDTH] IN BLOOD BY AUTOMATED COUNT: 13 % (ref 11.8–14.4)
EST. AVERAGE GLUCOSE BLD GHB EST-MCNC: 186 MG/DL
GFR, ESTIMATED: 66 ML/MIN/1.73M2
GLUCOSE SERPL-MCNC: 157 MG/DL (ref 74–99)
HBA1C MFR BLD: 8.1 % (ref 4–6)
HCT VFR BLD AUTO: 50.5 % (ref 40.7–50.3)
HGB BLD-MCNC: 15.7 G/DL (ref 13–17)
MCH RBC QN AUTO: 27.8 PG (ref 25.2–33.5)
MCHC RBC AUTO-ENTMCNC: 31.1 G/DL (ref 28.4–34.8)
MCV RBC AUTO: 89.5 FL (ref 82.6–102.9)
NRBC BLD-RTO: 0 PER 100 WBC
PLATELET # BLD AUTO: 239 K/UL (ref 138–453)
PMV BLD AUTO: 12 FL (ref 8.1–13.5)
POTASSIUM SERPL-SCNC: 4.9 MMOL/L (ref 3.7–5.3)
RBC # BLD AUTO: 5.64 M/UL (ref 4.21–5.77)
SODIUM SERPL-SCNC: 139 MMOL/L (ref 136–145)
WBC OTHER # BLD: 10.8 K/UL (ref 3.5–11.3)

## 2025-05-05 PROCEDURE — 83036 HEMOGLOBIN GLYCOSYLATED A1C: CPT

## 2025-05-05 PROCEDURE — 36415 COLL VENOUS BLD VENIPUNCTURE: CPT

## 2025-05-05 PROCEDURE — 80048 BASIC METABOLIC PNL TOTAL CA: CPT

## 2025-05-05 PROCEDURE — 85027 COMPLETE CBC AUTOMATED: CPT

## 2025-05-05 PROCEDURE — 93005 ELECTROCARDIOGRAM TRACING: CPT

## 2025-05-05 NOTE — PRE-PROCEDURE INSTRUCTIONS
On the Day of Your Surgery, Thursday, 5/22/25, Please Arrive At 0545 AM     Enter [facility] through the Main Entrance, take the lobby elevators to the second floor and check in at the Surgery Registration desk.     Continue to take your home medications as you normally do up to and including the night before surgery with the exception of blood thinning medications.      Blood Thinning Medications:  Please stop prescription blood thinning medications such as Apixaban (Eliquis); Clopidogrel (Plavix); Dabigatran (Pradaxa); Prasugrel (Effient); Rivaroxaban (Xarelto); Ticagrelor (Brilinta); Warfarin (Coumadin) only as directed by your surgeon and/or the prescribing physician    Some common examples of other medications that can thin your blood are: Aspirin, Ibuprofen (Advil, Motrin), Naproxen (Aleve), Meloxicam (Mobic), Celecoxib (Celebrex), Fish Oil, many Herbal Supplements.  These medications should usually be stopped at least 7 days prior to surgery.    Do not take ozempic on Saturday 5/17/25.    Tylenol is OK to take for pain the week prior to surgery.    Failure to stop certain medications may interfere with your scheduled surgery.    If you receive instructions from your surgeon regarding what medications to stop prior to surgery, please follow those specific instructions.    If You Have Diabetes:  Do not take any of your diabetic medications, (injectables or by mouth) the morning of surgery unless otherwise instructed by the doctor who manages your diabetes. If you are taking insulin, contact the doctor the manages your diabetes for instructions about any changes to your insulin dosages the day before surgery.      Check your blood sugar the morning of surgery.  If your blood sugar is less than 70, please call the Pre-op department at [phone] for further instructions.    Please take the following medication(s) the day of surgery with small sips of water:              Omeprazole, metoprolol,

## 2025-05-06 ENCOUNTER — ANESTHESIA EVENT (OUTPATIENT)
Dept: OPERATING ROOM | Age: 55
End: 2025-05-06
Payer: COMMERCIAL

## 2025-05-06 LAB
EKG ATRIAL RATE: 82 BPM
EKG P AXIS: 43 DEGREES
EKG P-R INTERVAL: 182 MS
EKG Q-T INTERVAL: 386 MS
EKG QRS DURATION: 106 MS
EKG QTC CALCULATION (BAZETT): 450 MS
EKG R AXIS: -29 DEGREES
EKG T AXIS: 4 DEGREES
EKG VENTRICULAR RATE: 82 BPM

## 2025-05-15 ENCOUNTER — HOSPITAL ENCOUNTER (OUTPATIENT)
Age: 55
Setting detail: SPECIMEN
Discharge: HOME | End: 2025-05-15

## 2025-05-18 LAB
MICROORGANISM SPEC CULT: ABNORMAL
MICROORGANISM SPEC CULT: ABNORMAL
MICROORGANISM/AGENT SPEC: ABNORMAL
MICROORGANISM/AGENT SPEC: ABNORMAL
SPECIMEN DESCRIPTION: ABNORMAL

## 2025-05-22 ENCOUNTER — APPOINTMENT (OUTPATIENT)
Dept: GENERAL RADIOLOGY | Age: 55
End: 2025-05-22
Attending: PODIATRIST
Payer: COMMERCIAL

## 2025-05-22 ENCOUNTER — ANESTHESIA (OUTPATIENT)
Dept: OPERATING ROOM | Age: 55
End: 2025-05-22
Payer: COMMERCIAL

## 2025-05-22 ENCOUNTER — HOSPITAL ENCOUNTER (OUTPATIENT)
Age: 55
Setting detail: OUTPATIENT SURGERY
Discharge: HOME | End: 2025-05-22
Attending: PODIATRIST | Admitting: PODIATRIST
Payer: COMMERCIAL

## 2025-05-22 VITALS
RESPIRATION RATE: 13 BRPM | WEIGHT: 251 LBS | OXYGEN SATURATION: 96 % | HEIGHT: 73 IN | TEMPERATURE: 97.2 F | DIASTOLIC BLOOD PRESSURE: 79 MMHG | SYSTOLIC BLOOD PRESSURE: 127 MMHG | BODY MASS INDEX: 33.27 KG/M2 | HEART RATE: 86 BPM

## 2025-05-22 DIAGNOSIS — G89.18 POSTOPERATIVE PAIN: Primary | ICD-10-CM

## 2025-05-22 DIAGNOSIS — M21.6X9 ACQUIRED EQUINUS DEFORMITY OF FOOT, UNSPECIFIED LATERALITY: ICD-10-CM

## 2025-05-22 LAB — GLUCOSE BLD-MCNC: 164 MG/DL (ref 75–110)

## 2025-05-22 PROCEDURE — 6360000002 HC RX W HCPCS

## 2025-05-22 PROCEDURE — 7100000010 HC PHASE II RECOVERY - FIRST 15 MIN: Performed by: PODIATRIST

## 2025-05-22 PROCEDURE — 87070 CULTURE OTHR SPECIMN AEROBIC: CPT

## 2025-05-22 PROCEDURE — 6360000002 HC RX W HCPCS: Performed by: PODIATRIST

## 2025-05-22 PROCEDURE — 7100000011 HC PHASE II RECOVERY - ADDTL 15 MIN: Performed by: PODIATRIST

## 2025-05-22 PROCEDURE — 73630 X-RAY EXAM OF FOOT: CPT

## 2025-05-22 PROCEDURE — 82947 ASSAY GLUCOSE BLOOD QUANT: CPT

## 2025-05-22 PROCEDURE — 87176 TISSUE HOMOGENIZATION CULTR: CPT

## 2025-05-22 PROCEDURE — 88305 TISSUE EXAM BY PATHOLOGIST: CPT

## 2025-05-22 PROCEDURE — 2580000003 HC RX 258: Performed by: ANESTHESIOLOGY

## 2025-05-22 PROCEDURE — 3600000002 HC SURGERY LEVEL 2 BASE: Performed by: PODIATRIST

## 2025-05-22 PROCEDURE — 2720000010 HC SURG SUPPLY STERILE: Performed by: PODIATRIST

## 2025-05-22 PROCEDURE — 3700000000 HC ANESTHESIA ATTENDED CARE: Performed by: PODIATRIST

## 2025-05-22 PROCEDURE — 2709999900 HC NON-CHARGEABLE SUPPLY: Performed by: PODIATRIST

## 2025-05-22 PROCEDURE — 88311 DECALCIFY TISSUE: CPT

## 2025-05-22 PROCEDURE — 6360000002 HC RX W HCPCS: Performed by: NURSE ANESTHETIST, CERTIFIED REGISTERED

## 2025-05-22 PROCEDURE — 87205 SMEAR GRAM STAIN: CPT

## 2025-05-22 PROCEDURE — 3600000012 HC SURGERY LEVEL 2 ADDTL 15MIN: Performed by: PODIATRIST

## 2025-05-22 PROCEDURE — 3700000001 HC ADD 15 MINUTES (ANESTHESIA): Performed by: PODIATRIST

## 2025-05-22 PROCEDURE — 87075 CULTR BACTERIA EXCEPT BLOOD: CPT

## 2025-05-22 PROCEDURE — 2500000003 HC RX 250 WO HCPCS: Performed by: PODIATRIST

## 2025-05-22 RX ORDER — HYDROCODONE BITARTRATE AND ACETAMINOPHEN 5; 325 MG/1; MG/1
1 TABLET ORAL EVERY 4 HOURS PRN
Qty: 12 TABLET | Refills: 0 | Status: SHIPPED | OUTPATIENT
Start: 2025-05-22 | End: 2025-05-25

## 2025-05-22 RX ORDER — SUZETRIGINE 50 MG/1
50 TABLET, FILM COATED ORAL DAILY
Qty: 21 TABLET | Refills: 0 | Status: SHIPPED | OUTPATIENT
Start: 2025-05-22 | End: 2025-06-12

## 2025-05-22 RX ORDER — MAGNESIUM HYDROXIDE 1200 MG/15ML
LIQUID ORAL CONTINUOUS PRN
Status: COMPLETED | OUTPATIENT
Start: 2025-05-22 | End: 2025-05-22

## 2025-05-22 RX ORDER — PROPOFOL 10 MG/ML
INJECTION, EMULSION INTRAVENOUS
Status: DISCONTINUED | OUTPATIENT
Start: 2025-05-22 | End: 2025-05-22 | Stop reason: SDUPTHER

## 2025-05-22 RX ORDER — FENTANYL CITRATE 50 UG/ML
25 INJECTION, SOLUTION INTRAMUSCULAR; INTRAVENOUS EVERY 5 MIN PRN
Status: DISCONTINUED | OUTPATIENT
Start: 2025-05-22 | End: 2025-05-22 | Stop reason: HOSPADM

## 2025-05-22 RX ORDER — SODIUM CHLORIDE 9 MG/ML
INJECTION, SOLUTION INTRAVENOUS CONTINUOUS
Status: DISCONTINUED | OUTPATIENT
Start: 2025-05-22 | End: 2025-05-22 | Stop reason: HOSPADM

## 2025-05-22 RX ORDER — SODIUM CHLORIDE 0.9 % (FLUSH) 0.9 %
5-40 SYRINGE (ML) INJECTION PRN
Status: DISCONTINUED | OUTPATIENT
Start: 2025-05-22 | End: 2025-05-22 | Stop reason: HOSPADM

## 2025-05-22 RX ORDER — BUPIVACAINE HYDROCHLORIDE 5 MG/ML
INJECTION, SOLUTION EPIDURAL; INTRACAUDAL; PERINEURAL
Status: DISCONTINUED
Start: 2025-05-22 | End: 2025-05-22 | Stop reason: HOSPADM

## 2025-05-22 RX ORDER — LIDOCAINE HYDROCHLORIDE 20 MG/ML
INJECTION, SOLUTION EPIDURAL; INFILTRATION; INTRACAUDAL; PERINEURAL
Status: DISCONTINUED | OUTPATIENT
Start: 2025-05-22 | End: 2025-05-22 | Stop reason: SDUPTHER

## 2025-05-22 RX ORDER — LIDOCAINE HYDROCHLORIDE 10 MG/ML
INJECTION, SOLUTION INFILTRATION; PERINEURAL
Status: DISCONTINUED
Start: 2025-05-22 | End: 2025-05-22 | Stop reason: HOSPADM

## 2025-05-22 RX ORDER — SODIUM CHLORIDE 0.9 % (FLUSH) 0.9 %
5-40 SYRINGE (ML) INJECTION EVERY 12 HOURS SCHEDULED
Status: DISCONTINUED | OUTPATIENT
Start: 2025-05-22 | End: 2025-05-22 | Stop reason: HOSPADM

## 2025-05-22 RX ORDER — NALOXONE HYDROCHLORIDE 0.4 MG/ML
INJECTION, SOLUTION INTRAMUSCULAR; INTRAVENOUS; SUBCUTANEOUS PRN
Status: DISCONTINUED | OUTPATIENT
Start: 2025-05-22 | End: 2025-05-22 | Stop reason: HOSPADM

## 2025-05-22 RX ORDER — METOCLOPRAMIDE HYDROCHLORIDE 5 MG/ML
10 INJECTION INTRAMUSCULAR; INTRAVENOUS
Status: DISCONTINUED | OUTPATIENT
Start: 2025-05-22 | End: 2025-05-22 | Stop reason: HOSPADM

## 2025-05-22 RX ORDER — SODIUM CHLORIDE 9 MG/ML
INJECTION, SOLUTION INTRAVENOUS PRN
Status: DISCONTINUED | OUTPATIENT
Start: 2025-05-22 | End: 2025-05-22 | Stop reason: HOSPADM

## 2025-05-22 RX ORDER — OXYCODONE HYDROCHLORIDE 5 MG/1
5 TABLET ORAL
Status: DISCONTINUED | OUTPATIENT
Start: 2025-05-22 | End: 2025-05-22 | Stop reason: HOSPADM

## 2025-05-22 RX ORDER — SODIUM CHLORIDE, SODIUM LACTATE, POTASSIUM CHLORIDE, CALCIUM CHLORIDE 600; 310; 30; 20 MG/100ML; MG/100ML; MG/100ML; MG/100ML
INJECTION, SOLUTION INTRAVENOUS CONTINUOUS
Status: DISCONTINUED | OUTPATIENT
Start: 2025-05-22 | End: 2025-05-22 | Stop reason: HOSPADM

## 2025-05-22 RX ORDER — DIPHENHYDRAMINE HYDROCHLORIDE 50 MG/ML
12.5 INJECTION, SOLUTION INTRAMUSCULAR; INTRAVENOUS
Status: DISCONTINUED | OUTPATIENT
Start: 2025-05-22 | End: 2025-05-22 | Stop reason: HOSPADM

## 2025-05-22 RX ORDER — CEFAZOLIN SODIUM/WATER 2 G/20 ML
2000 SYRINGE (ML) INTRAVENOUS ONCE
Status: COMPLETED | OUTPATIENT
Start: 2025-05-22 | End: 2025-05-22

## 2025-05-22 RX ORDER — KETOROLAC TROMETHAMINE 30 MG/ML
INJECTION, SOLUTION INTRAMUSCULAR; INTRAVENOUS
Status: DISCONTINUED | OUTPATIENT
Start: 2025-05-22 | End: 2025-05-22 | Stop reason: SDUPTHER

## 2025-05-22 RX ORDER — PROCHLORPERAZINE EDISYLATE 5 MG/ML
10 INJECTION INTRAMUSCULAR; INTRAVENOUS
Status: DISCONTINUED | OUTPATIENT
Start: 2025-05-22 | End: 2025-05-22 | Stop reason: HOSPADM

## 2025-05-22 RX ORDER — MIDAZOLAM HYDROCHLORIDE 1 MG/ML
INJECTION, SOLUTION INTRAMUSCULAR; INTRAVENOUS
Status: DISCONTINUED | OUTPATIENT
Start: 2025-05-22 | End: 2025-05-22 | Stop reason: SDUPTHER

## 2025-05-22 RX ORDER — LIDOCAINE HYDROCHLORIDE 10 MG/ML
1 INJECTION, SOLUTION EPIDURAL; INFILTRATION; INTRACAUDAL; PERINEURAL
Status: DISCONTINUED | OUTPATIENT
Start: 2025-05-23 | End: 2025-05-22 | Stop reason: HOSPADM

## 2025-05-22 RX ORDER — MEPERIDINE HYDROCHLORIDE 50 MG/ML
12.5 INJECTION INTRAMUSCULAR; INTRAVENOUS; SUBCUTANEOUS EVERY 5 MIN PRN
Status: DISCONTINUED | OUTPATIENT
Start: 2025-05-22 | End: 2025-05-22 | Stop reason: HOSPADM

## 2025-05-22 RX ORDER — HYDROMORPHONE HYDROCHLORIDE 1 MG/ML
0.5 INJECTION, SOLUTION INTRAMUSCULAR; INTRAVENOUS; SUBCUTANEOUS EVERY 5 MIN PRN
Status: DISCONTINUED | OUTPATIENT
Start: 2025-05-22 | End: 2025-05-22 | Stop reason: HOSPADM

## 2025-05-22 RX ADMIN — KETOROLAC TROMETHAMINE 30 MG: 30 INJECTION, SOLUTION INTRAMUSCULAR at 08:24

## 2025-05-22 RX ADMIN — MIDAZOLAM 2 MG: 1 INJECTION INTRAMUSCULAR; INTRAVENOUS at 07:29

## 2025-05-22 RX ADMIN — PROPOFOL 150 MCG/KG/MIN: 10 INJECTION, EMULSION INTRAVENOUS at 08:15

## 2025-05-22 RX ADMIN — Medication 2000 MG: at 07:39

## 2025-05-22 RX ADMIN — LIDOCAINE HYDROCHLORIDE 60 MG: 20 INJECTION, SOLUTION EPIDURAL; INFILTRATION; INTRACAUDAL; PERINEURAL at 07:34

## 2025-05-22 RX ADMIN — SODIUM CHLORIDE, POTASSIUM CHLORIDE, SODIUM LACTATE AND CALCIUM CHLORIDE: 600; 310; 30; 20 INJECTION, SOLUTION INTRAVENOUS at 07:29

## 2025-05-22 RX ADMIN — PROPOFOL 150 MCG/KG/MIN: 10 INJECTION, EMULSION INTRAVENOUS at 07:34

## 2025-05-22 RX ADMIN — SODIUM CHLORIDE, POTASSIUM CHLORIDE, SODIUM LACTATE AND CALCIUM CHLORIDE: 600; 310; 30; 20 INJECTION, SOLUTION INTRAVENOUS at 08:16

## 2025-05-22 ASSESSMENT — LIFESTYLE VARIABLES: SMOKING_STATUS: 1

## 2025-05-22 ASSESSMENT — PAIN - FUNCTIONAL ASSESSMENT: PAIN_FUNCTIONAL_ASSESSMENT: 0-10

## 2025-05-22 ASSESSMENT — ENCOUNTER SYMPTOMS
TROUBLE SWALLOWING: 0
VOMITING: 0
BLOOD IN STOOL: 0
SINUS PRESSURE: 0
NAUSEA: 0
RHINORRHEA: 0
ABDOMINAL PAIN: 0
APNEA: 0
DIARRHEA: 1
SHORTNESS OF BREATH: 0
CHEST TIGHTNESS: 0
SORE THROAT: 0
BACK PAIN: 0
CONSTIPATION: 0
COUGH: 0
WHEEZING: 0

## 2025-05-22 ASSESSMENT — PAIN DESCRIPTION - DESCRIPTORS: DESCRIPTORS: THROBBING

## 2025-05-22 NOTE — OP NOTE
PODIATRY OP NOTE    PATIENT NAME: Braden Baptiste  YOB: 1970  -  54 y.o. male  MRN: [id number]  DATE: 5/22/2025  BILLING #: [id number]    Surgeon(s):  Eric Gandhi DPM     ASSISTANTS: Jose Lema DPM PGY 1, Suzette Dewitt, MS-IV    PRE-OP DIAGNOSIS:   Osteomyelitis, right second digit  Hammertoe deformity, right fourth digit    POST-OP DIAGNOSIS: Same as above.    PROCEDURE:   Fourth metatarsal head resection, right foot (CPT 75132)  Partial amputation at the PIPJ, right second digit (CPT 43077)  Fillet of toe, right second digit (CPT 39813)    ANESTHESIA: MAC    HEMOSTASIS: Pneumatic tourniquet to right ankle@250 mmHg for 45 minutes.    ESTIMATED BLOOD LOSS: Less than 5 cc.    MATERIALS: None  * No implants in log *    INJECTABLES: 15 cc of one-to-one 1% lidocaine plain to 0.5% bupivacaine plain.    SPECIMEN: Delica  ID Type Source Tests Collected by Time Destination   1 : RIGHT SECOND  TOE Tissue Foot CULTURE, TISSUE (WITH GRAM STAIN) Eric Gandhi DPM 5/22/2025 0803    A : RIGHT SECOND DIGIT Tissue Foot SURGICAL PATHOLOGY Eric Gandhi DPM 5/22/2025 0757    B : RIGHT FOURTH METATARSAL HEAD Tissue Foot SURGICAL PATHOLOGY Eric Gandhi DPM 5/22/2025 0818        COMPLICATIONS: None    FINDINGS: Brittle bone, gross decrease in bone density in fourth metatarsal as well as second digit phalangeal bones.    INDICATIONS FOR PROCEDURE: Patient has chronic osteomyelitis of the right second digit that is recalcitrant to conservative and advanced wound care modalities.  Surgery was discussed with the patient and he proceeded to go forward with amputation of the second digit.  Patient also has a painful hammertoe that has been recalcitrant to conservative measures.  The patient elected to go forward with decompression osteotomy.    PROCEDURE IN DETAIL: Under mild sedation the patient was transported from pre-op to the operating room and placed on the operating table in the supine

## 2025-05-22 NOTE — DISCHARGE INSTRUCTIONS
Podiatric Post Operative Instructions:  You have had a surgical procedure on your right foot.      Fluids and Diet:  Begin with clear liquids, broth, dry toast, and crackers.  If not nauseated then resume your regular pre-operative diet when you are ready    Medications:  Take your prescriptions as directed  You are receiving new prescriptions for Journavx, Norco  If your pain is not severe then you may take the non-prescription medication that you normally take for aches and pains  You may resume your regularly scheduled medications (unless otherwise directed)  If any side effects or adverse reactions occur, discontinue the medication and contact your doctor.  Review the patient drug information that is provided before you take any medication    Ambulation and Activity:  You are advised to go directly home from the hospital  Use CAM boot at all times   You may put weight on the operated foot.  You should wear the surgical shoe at all times when awake.  Avoid stairs if possible.  Do not lift or move heavy objects  Do not drive until cleared by your physician    Bandage and Wound Care Instructions:  Keep bandage clean and dry  Do not shower or bathe the operative extremity  Do not remove the bandage (unless otherwise directed)   Do not attempt to put anything between the cast or dressing and your skin, some itching is normal.    Ice and Elevation:  Elevate operative extremity as much as possible to reduce swelling and discomfort.  Elevate with 2 pillows at or above the level of the heart for the first 72 hours.  Ice:  Apply Hospital dispensed insulated ice bag over the bandage 20 minutes of every hour while awake for the first 72 hours.  You may behind the knee as well.    Special Instructions: Call your doctor immediately if you develop any of the following.  Fever over 100 degrees by mouth - take your temperature daily until your first follow up visit.  Pain not relieved by medication ordered  Swelling, increased

## 2025-05-22 NOTE — H&P
History and Physical Service   [facility]    HISTORY AND PHYSICAL EXAMINATION            Date of Evaluation: 5/22/2025  Patient name:  Braden Baptiste  MRN:   [id number]  YOB: 1970  PCP:    Umesh Zhang MD    History Obtained From:     Patient, medical records    History of Present Illness:     This is Braden Baptiste a 54 y.o. male who presents today for a WEIL  OSTEOTOMY 4TH MET RIGHT FOOT, TOE AMPUTATION- RIGHT THIRD by Eric Gandhi DPM for Acquired equinus deformity of foot, unspecified laterality. Patient reports recurrent non-healing foot wounds and infections over the years due to diabetes. Known diabetes, on insulin, POC . Last Ozempic 5/10/2025. Denies any current blood thinning medications.       Past Medical History:     Past Medical History:   Diagnosis Date    CAD (coronary artery disease)     With stent placed 2005    Diabetes mellitus (HCC)     managed by PCP, diagnosed at age 24    GERD (gastroesophageal reflux disease)     Hypertension     Under care of team             Past Surgical History:     Past Surgical History:   Procedure Laterality Date    ARM SURGERY Left     ulnar nerve release    CARDIAC CATHETERIZATION  2005    With stent placement    CERVICAL FUSION N/A 02/22/2021    C 5-6 CERVICAL DISCECTOMY FUSION ANTERIOR - [employer]TR[employer]ICS performed by Ulices Escudero MD at [facility] OR    CHOLECYSTECTOMY  01/2024    FOOT DEBRIDEMENT Right 2/26/2024    *ADD ON, PAMELA TF* FOOT DEBRIDEMENT INCISION AND DRAINAGE WITH BONE BIOPSIES performed by Marline Johnson DPM at [facility] OR     CATH POWER PICC SINGLE  02/28/2024    KNEE ARTHROSCOPY Bilateral     SHOULDER ARTHROPLASTY Bilateral     THROAT SURGERY  2020    excision of benign lesions        Medications Prior to Admission:     Prior to Admission medications    Medication Sig Start Date End Date Taking? Authorizing Provider   Continuous Blood Gluc Sensor (DEXCOM G6 SENSOR) MISC CHECK BLOOD SUGAR DAILY

## 2025-05-22 NOTE — ANESTHESIA PRE PROCEDURE
Department of Anesthesiology  Preprocedure Note       Name:  Braden Baptiste   Age:  54 y.o.  :  1970                                          MRN:  [id number]         Date:  2025      Surgeon: Surgeon(s):  Eric Gandhi DPM    Procedure: Procedure(s):  WEIL  OSTEOTOMY 4TH MET RIGHT FOOT  TOE AMPUTATION- RIGHT THIRD    Medications prior to admission:   Prior to Admission medications    Medication Sig Start Date End Date Taking? Authorizing Provider   Continuous Blood Gluc Sensor (DEXCOM G6 SENSOR) MISC CHECK BLOOD SUGAR DAILY AND CHANGE SENSOR EVERY 10 DAYS 3/15/24  Yes Dasha Huang MD   Continuous Blood Gluc Transmit (DEXCOM G6 TRANSMITTER) MISC CHANGE TRANSMITTER EVERY 3 MONTHS 24  Yes Dasha Huang MD   metoprolol succinate (TOPROL XL) 50 MG extended release tablet Take 1 tablet by mouth daily   Yes Dasha Huang MD   gabapentin (NEURONTIN) 600 MG tablet Take 1 tablet by mouth 3 times daily.   Yes Dasha Huang MD   Insulin Degludec (TRESIBA) 100 UNIT/ML SOLN Inject 60 Units into the skin daily Pt take 60 units   Yes Dasha Huang MD   omeprazole (PRILOSEC) 20 MG delayed release capsule Take 2 capsules by mouth daily   Yes Dasha Huang MD   dapagliflozin (FARXIGA) 10 MG tablet Take 1 tablet by mouth every morning   Yes Dasha Huang MD   metFORMIN (GLUCOPHAGE) 500 MG tablet Take 2 tablets by mouth 2 times daily (with meals)   Yes Dasha Huang MD   lisinopril (PRINIVIL;ZESTRIL) 40 MG tablet Take 0.5 tablets by mouth daily   Yes Dasha Huang MD   Insulin Aspart (NOVOLOG SC) Inject into the skin as needed   Yes Dasha Huang MD   OZEMPIC, 0.25 OR 0.5 MG/DOSE, 2 MG/3ML SOPN Inject 2 mg into the skin once a week     Dasha Huang MD       Current medications:    Current Facility-Administered Medications   Medication Dose Route Frequency Provider Last Rate Last Admin   • [START ON 2025] lidocaine

## 2025-05-22 NOTE — ANESTHESIA POSTPROCEDURE EVALUATION
Department of Anesthesiology  Postprocedure Note    Patient: Braden Baptiste  MRN: [id number]  YOB: 1970  Date of evaluation: 5/22/2025    Procedure Summary       Date: 05/22/25 Room / Location: [address]    Anesthesia Start: 0729 Anesthesia Stop: 0842    Procedures:       METATARSAL HEAD RESECTION 4TH MET RIGHT FOOT (Right: Fourth Toe)      TOE AMPUTATION- RIGHT SECOND (Right: Second Toe) Diagnosis:       Acquired equinus deformity of foot, unspecified laterality      (Acquired equinus deformity of foot, unspecified laterality [M21.6X9])    Surgeons: Eric Gandhi DPM Responsible Provider: Miguelito Sinha DO    Anesthesia Type: MAC ASA Status: 3            Anesthesia Type: MAC    Alejandra Phase I: Alejandra Score: 10    Alejandra Phase II: Alejandra Score: 10    Anesthesia Post Evaluation    Patient location during evaluation: PACU  Patient participation: complete - patient participated  Level of consciousness: awake and alert  Airway patency: patent  Nausea & Vomiting: no nausea and no vomiting  Cardiovascular status: hemodynamically stable  Respiratory status: acceptable  Hydration status: stable  Pain management: adequate    No notable events documented.

## 2025-05-25 LAB
MICROORGANISM SPEC CULT: ABNORMAL
MICROORGANISM SPEC CULT: ABNORMAL
MICROORGANISM/AGENT SPEC: ABNORMAL
SERVICE CMNT-IMP: ABNORMAL
SPECIMEN DESCRIPTION: ABNORMAL

## 2025-05-27 LAB
MICROORGANISM SPEC CULT: ABNORMAL
MICROORGANISM SPEC CULT: ABNORMAL
MICROORGANISM/AGENT SPEC: ABNORMAL
SERVICE CMNT-IMP: ABNORMAL
SPECIMEN DESCRIPTION: ABNORMAL
SURGICAL PATHOLOGY REPORT: NORMAL

## (undated) DEVICE — GAUZE,PACKING STRIP,PLAIN,1/4"X5YD,STRL: Brand: CURAD

## (undated) DEVICE — GAUZE, BORDER, 3"X6", 1.5"X4"PAD, STERIL: Brand: MEDLINE INDUSTRIES, INC.

## (undated) DEVICE — GOWN,SIRUS,NONRNF,SETINSLV,2XL,18/CS: Brand: MEDLINE

## (undated) DEVICE — SUTURE VICRYL + SZ 4-0 L27IN ABSRB UD PS-2 3/8 CIR REV CUT VCP426H

## (undated) DEVICE — GLOVE ORANGE PI 8 1/2   MSG9085

## (undated) DEVICE — GLOVE SURG SZ 85 CRM LTX FREE POLYISOPRENE POLYMER BEAD ANTI

## (undated) DEVICE — SOLUTION IV 500ML 0.9% SOD BOTTLE CHL LTWT DURABLE SHATTERPROOF

## (undated) DEVICE — SPONGE,PEANUT,XRAY,ST,SM,3/8",5/CARD: Brand: MEDLINE INDUSTRIES, INC.

## (undated) DEVICE — NEEDLE BX 11GA L101MM BNE MAR ASPIR W/ ADPT JAMSH

## (undated) DEVICE — ULTRACLEAN ACCESSORY ELECTRODE, 1 INCH COATED NEEDLE WITH EXTENDED INSULATION: Brand: ULTRACLEAN

## (undated) DEVICE — AGENT HEMSTAT 8ML FLX TIP MTRX + DISP SURGIFLO

## (undated) DEVICE — SKIN PREP TRAY W/CHG: Brand: MEDLINE INDUSTRIES, INC.

## (undated) DEVICE — 1010 S-DRAPE TOWEL DRAPE 10/BX: Brand: STERI-DRAPE™

## (undated) DEVICE — 4-PORT MANIFOLD: Brand: NEPTUNE 2

## (undated) DEVICE — YANKAUER,FLEXIBLE HANDLE,REGLR CAPACITY: Brand: MEDLINE INDUSTRIES, INC.

## (undated) DEVICE — TUBING, SUCTION, 1/4" X 12', STRAIGHT: Brand: MEDLINE

## (undated) DEVICE — STAZ LOWER EXTREMITY: Brand: MEDLINE INDUSTRIES, INC.

## (undated) DEVICE — GLOVE SURG SZ 75 L12IN FNGR THK79MIL GRN LTX FREE

## (undated) DEVICE — DRILL BIT 7080510 11 MM DRILL BIT S

## (undated) DEVICE — STRAP,POSITIONING,KNEE/BODY,FOAM,4X60": Brand: MEDLINE

## (undated) DEVICE — 60-7070-103 TRNQT,DPSB,PLC RED: Brand: MEDLINE RENEWAL

## (undated) DEVICE — PRECISION THIN (5.5 X 0.38 X 11.5MM)

## (undated) DEVICE — PAD ADH AD ELECTRD 2 PLT W 5M CRD

## (undated) DEVICE — SUTURE N ABSRB L 18 IN SZ 4-0 NDL L 19 MM NYL MONOFILAMENT

## (undated) DEVICE — CORD,CAUTERY,BIPOLAR,STERILE: Brand: MEDLINE

## (undated) DEVICE — GLOVE SURG SZ 75 CRM LTX FREE POLYISOPRENE POLYMER BEAD ANTI

## (undated) DEVICE — SUTURE VCRL SZ 3-0 L27IN ABSRB UD L26MM SH 1/2 CIR J416H

## (undated) DEVICE — PREP-RESISTANT MARKER W/ RULER: Brand: MEDLINE INDUSTRIES, INC.

## (undated) DEVICE — SVMMC POD PK

## (undated) DEVICE — PROTECTOR EYE PT SELF ADH NS OPT GRD LF

## (undated) DEVICE — 4.0MM PRECISION ROUND

## (undated) DEVICE — BLANKET WRM W40.2XL55.9IN IORT LO BODY + MISTRAL AIR

## (undated) DEVICE — MASTISOL ADHESIVE LIQ 2/3ML

## (undated) DEVICE — STRAP ARMBRD W1.5XL32IN FOAM STR YET SFT W/ HK AND LOOP

## (undated) DEVICE — CONTAINER,SPECIMEN,4OZ,OR STRL: Brand: MEDLINE

## (undated) DEVICE — GOWN,AURORA,NON-REINFORCED,2XL: Brand: MEDLINE

## (undated) DEVICE — GLOVE SURG SZ 85 L12IN FNGR ORTHO 126MIL CRM LTX FREE

## (undated) DEVICE — APPLICATOR MEDICATED 10.5 CC SOLUTION HI LT ORNG CHLORAPREP

## (undated) DEVICE — ABS MED DISTRACTION PIN, 14MM PATIENT (INNER): Brand: ABS MED DISTRACTION PIN

## (undated) DEVICE — CONTAINER,SPECIMEN,OR STERILE,4OZ: Brand: MEDLINE

## (undated) DEVICE — GLOVE SURG SZ 7 CRM LTX FREE POLYISOPRENE POLYMER BEAD ANTI

## (undated) DEVICE — Device

## (undated) DEVICE — SUTURE MONOCRYL SZ 5-0 L18IN ABSRB UD PC-3 L16MM 3/8 CIR Y844G

## (undated) DEVICE — ADHESIVE SKIN CLSR 0.7ML TOP DERMBND ADV

## (undated) DEVICE — BANDAGE COBAN 4 IN COMPR W4INXL5YD FOAM COHESIVE QUIK STK SELF ADH SFT

## (undated) DEVICE — SMALL TEAR CROSS CUT RASP (11.0 X 5.0MM)

## (undated) DEVICE — CODMAN® SURGICAL PATTIES 1" X 1" (2.54CM X 2.54CM): Brand: CODMAN®

## (undated) DEVICE — SUTURE MCRYL SZ 4-0 L27IN ABSRB UD L19MM PS-2 1/2 CIR PRIM Y426H